# Patient Record
Sex: MALE | Race: WHITE | NOT HISPANIC OR LATINO | Employment: FULL TIME | URBAN - METROPOLITAN AREA
[De-identification: names, ages, dates, MRNs, and addresses within clinical notes are randomized per-mention and may not be internally consistent; named-entity substitution may affect disease eponyms.]

---

## 2017-04-03 ENCOUNTER — OFFICE VISIT (OUTPATIENT)
Dept: FAMILY MEDICINE | Facility: CLINIC | Age: 47
End: 2017-04-03
Payer: COMMERCIAL

## 2017-04-03 VITALS
WEIGHT: 185 LBS | HEIGHT: 67 IN | TEMPERATURE: 98.4 F | BODY MASS INDEX: 29.03 KG/M2 | SYSTOLIC BLOOD PRESSURE: 124 MMHG | OXYGEN SATURATION: 97 % | RESPIRATION RATE: 16 BRPM | DIASTOLIC BLOOD PRESSURE: 75 MMHG | HEART RATE: 66 BPM

## 2017-04-03 DIAGNOSIS — E06.3 HYPOTHYROIDISM DUE TO HASHIMOTO'S THYROIDITIS: Primary | ICD-10-CM

## 2017-04-03 DIAGNOSIS — F43.22 ADJUSTMENT DISORDER WITH ANXIOUS MOOD: ICD-10-CM

## 2017-04-03 LAB
ANION GAP SERPL CALCULATED.3IONS-SCNC: 9 MMOL/L (ref 3–14)
BUN SERPL-MCNC: 14 MG/DL (ref 7–30)
CALCIUM SERPL-MCNC: 8.9 MG/DL (ref 8.5–10.1)
CHLORIDE SERPL-SCNC: 109 MMOL/L (ref 94–109)
CHOLEST SERPL-MCNC: 191 MG/DL
CO2 SERPL-SCNC: 24 MMOL/L (ref 20–32)
CREAT SERPL-MCNC: 0.79 MG/DL (ref 0.66–1.25)
ERYTHROCYTE [DISTWIDTH] IN BLOOD BY AUTOMATED COUNT: 12.7 % (ref 10–15)
GFR SERPL CREATININE-BSD FRML MDRD: NORMAL ML/MIN/1.7M2
GLUCOSE SERPL-MCNC: 96 MG/DL (ref 70–99)
HCT VFR BLD AUTO: 41.8 % (ref 40–53)
HDLC SERPL-MCNC: 39 MG/DL
HGB BLD-MCNC: 14.2 G/DL (ref 13.3–17.7)
LDLC SERPL CALC-MCNC: 126 MG/DL
MCH RBC QN AUTO: 30.8 PG (ref 26.5–33)
MCHC RBC AUTO-ENTMCNC: 34 G/DL (ref 31.5–36.5)
MCV RBC AUTO: 91 FL (ref 78–100)
NONHDLC SERPL-MCNC: 152 MG/DL
PLATELET # BLD AUTO: 244 10E9/L (ref 150–450)
POTASSIUM SERPL-SCNC: 4.2 MMOL/L (ref 3.4–5.3)
RBC # BLD AUTO: 4.61 10E12/L (ref 4.4–5.9)
SODIUM SERPL-SCNC: 142 MMOL/L (ref 133–144)
TRIGL SERPL-MCNC: 130 MG/DL
TSH SERPL DL<=0.05 MIU/L-ACNC: 0.91 MU/L (ref 0.4–4)
WBC # BLD AUTO: 7.2 10E9/L (ref 4–11)

## 2017-04-03 PROCEDURE — 36415 COLL VENOUS BLD VENIPUNCTURE: CPT | Performed by: FAMILY MEDICINE

## 2017-04-03 PROCEDURE — 99214 OFFICE O/P EST MOD 30 MIN: CPT | Performed by: FAMILY MEDICINE

## 2017-04-03 PROCEDURE — 85027 COMPLETE CBC AUTOMATED: CPT | Performed by: FAMILY MEDICINE

## 2017-04-03 PROCEDURE — 80061 LIPID PANEL: CPT | Performed by: FAMILY MEDICINE

## 2017-04-03 PROCEDURE — 84443 ASSAY THYROID STIM HORMONE: CPT | Performed by: FAMILY MEDICINE

## 2017-04-03 PROCEDURE — 80048 BASIC METABOLIC PNL TOTAL CA: CPT | Performed by: FAMILY MEDICINE

## 2017-04-03 ASSESSMENT — ANXIETY QUESTIONNAIRES
6. BECOMING EASILY ANNOYED OR IRRITABLE: NOT AT ALL
7. FEELING AFRAID AS IF SOMETHING AWFUL MIGHT HAPPEN: SEVERAL DAYS
IF YOU CHECKED OFF ANY PROBLEMS ON THIS QUESTIONNAIRE, HOW DIFFICULT HAVE THESE PROBLEMS MADE IT FOR YOU TO DO YOUR WORK, TAKE CARE OF THINGS AT HOME, OR GET ALONG WITH OTHER PEOPLE: SOMEWHAT DIFFICULT
3. WORRYING TOO MUCH ABOUT DIFFERENT THINGS: SEVERAL DAYS
2. NOT BEING ABLE TO STOP OR CONTROL WORRYING: NOT AT ALL
1. FEELING NERVOUS, ANXIOUS, OR ON EDGE: SEVERAL DAYS
5. BEING SO RESTLESS THAT IT IS HARD TO SIT STILL: NOT AT ALL

## 2017-04-03 NOTE — PROGRESS NOTES
SUBJECTIVE:                                                    Bill Worrell is a 46 year old male who presents to clinic today for the following health issues:      Abnormal Mood Symptoms     Onset: 3 months    Description:   Depression: no  Anxiety: YES    Accompanying Signs & Symptoms:  Still participating in activities that you used to enjoy: YES  Fatigue: YES  Irritability: no  Difficulty concentrating: no  Changes in appetite: no  Problems with sleep: no  Heart racing/beating fast : YES  Thoughts of hurting yourself or others: none     History:   Recent stress: YES- at work  Prior depression hospitalization: None  Family history of depression: no  Family history of anxiety: no      Precipitating factors:   Alcohol/drug use: no    Alleviating factors:  none       Therapies Tried and outcome: None      Pt feels whenever he is stressed, feels heart is pumping stronger, these symptoms last for 15 to 30 minutes also feels fatigue at the end of the day.    Problem list and histories reviewed & adjusted, as indicated.  Additional history: as documented    Patient Active Problem List   Diagnosis     Mixed hyperlipidemia     Hypothyroidism     Psoriasis     Hyperlipidemia LDL goal <160     No past surgical history on file.    Social History   Substance Use Topics     Smoking status: Former Smoker     Types: Cigars     Smokeless tobacco: Never Used      Comment: a cigar every 4 months     Alcohol use 0.0 oz/week     0 Standard drinks or equivalent per week      Comment: rarely     Family History   Problem Relation Age of Onset     HEART DISEASE Father      heart attack at age 55         Current Outpatient Prescriptions   Medication Sig Dispense Refill     levothyroxine (SYNTHROID,LEVOTHROID) 100 MCG tablet Take 1 tablet (100 mcg) by mouth daily 90 tablet 3     Allergies   Allergen Reactions     Dogs      Penicillins        Reviewed and updated as needed this visit by clinical staff       Reviewed and updated as  "needed this visit by Provider         ROS:  C: NEGATIVE for fever, chills, change in weight  CV: NEGATIVE for chest pain, palpitations or peripheral edema    OBJECTIVE:                                                    /75 (BP Location: Right arm, Patient Position: Chair, Cuff Size: Adult Regular)  Pulse 66  Temp 98.4  F (36.9  C) (Oral)  Resp 16  Ht 5' 6.5\" (1.689 m)  Wt 185 lb (83.9 kg)  SpO2 97%  BMI 29.41 kg/m2  Body mass index is 29.41 kg/(m^2).  GENERAL: healthy, alert and no distress  RESP: lungs clear to auscultation - no rales, rhonchi or wheezes  CV: regular rate and rhythm, normal S1 S2, no S3 or S4, no murmur, click or rub, no peripheral edema and peripheral pulses strong  MS: no gross musculoskeletal defects noted, no edema         ASSESSMENT/PLAN:                                                            1. Hypothyroidism due to Hashimoto's thyroiditis  Recheck.  - TSH    2. Adjustment disorder with anxious mood  Talked about ways to improve anxiety,   - Lipid Profile with reflex to direct LDL  - Basic metabolic panel  - CBC with platelets    FUTURE APPOINTMENTS:       - Follow-up visit in 1 year.    Arline Miller MD  [facility]    "

## 2017-04-03 NOTE — MR AVS SNAPSHOT
"              After Visit Summary   4/3/2017    Bill Worrell    MRN: [id number]           Patient Information     Date Of Birth          1970        Visit Information        Provider Department      4/3/2017 9:00 AM Arline Miller MD [facility]        Today's Diagnoses     Hypothyroidism due to Hashimoto's thyroiditis    -  1    Adjustment disorder with anxious mood           Follow-ups after your visit        Who to contact     If you have questions or need follow up information about today's clinic visit or your schedule please contact [facility] directly at [phone].  Normal or non-critical lab and imaging results will be communicated to you by [employer], letter or phone within 4 business days after the clinic has received the results. If you do not hear from us within 7 days, please contact the clinic through [employer] or phone. If you have a critical or abnormal lab result, we will notify you by phone as soon as possible.  Submit refill requests through [employer] or call your pharmacy and they will forward the refill request to us. Please allow 3 business days for your refill to be completed.          Additional Information About Your Visit        MyChart Information     [employer] lets you send messages to your doctor, view your test results, renew your prescriptions, schedule appointments and more. To sign up, go to [email or web address] . Click on \"Log in\" on the left side of the screen, which will take you to the Welcome page. Then click on \"Sign up Now\" on the right side of the page.     You will be asked to enter the access code listed below, as well as some personal information. Please follow the directions to create your username and password.     Your access code is: [email or web address]  Expires: 2017  9:43 AM     Your access code will  in 90 days. If you need help or a new code, please call your [facility] or [phone].        Care EveryWhere ID  " "   This is your Care EveryWhere ID. This could be used by other organizations to access your [place] medical records  [id number]        Your Vitals Were     Pulse Temperature Respirations Height Pulse Oximetry BMI (Body Mass Index)    66 98.4  F (36.9  C) (Oral) 16 5' 6.5\" (1.689 m) 97% 29.41 kg/m2       Blood Pressure from Last 3 Encounters:   04/03/17 124/75   05/14/16 114/69   04/11/15 124/82    Weight from Last 3 Encounters:   04/03/17 185 lb (83.9 kg)   05/14/16 184 lb (83.5 kg)   04/11/15 178 lb (80.7 kg)              We Performed the Following     Basic metabolic panel     CBC with platelets     Lipid Profile with reflex to direct LDL     TSH        Primary Care Provider Office Phone # Fax #    Arline Miller [phone]       [facility] [id number] [place] [address]        Thank you!     Thank you for choosing [facility]  for your care. Our goal is always to provide you with excellent care. Hearing back from our patients is one way we can continue to improve our services. Please take a few minutes to complete the written survey that you may receive in the mail after your visit with us. Thank you!             Your Updated Medication List - Protect others around you: Learn how to safely use, store and throw away your medicines at www.disposemymeds.org.          This list is accurate as of: 4/3/17  9:43 AM.  Always use your most recent med list.                   Brand Name Dispense Instructions for use    levothyroxine 100 MCG tablet    SYNTHROID/LEVOTHROID    90 tablet    Take 1 tablet (100 mcg) by mouth daily         "

## 2017-04-03 NOTE — NURSING NOTE
"Chief Complaint   Patient presents with     Anxiety     stress at work       Initial /75 (BP Location: Right arm, Patient Position: Chair, Cuff Size: Adult Regular)  Pulse 66  Temp 98.4  F (36.9  C) (Oral)  Resp 16  Ht 5' 6.5\" (1.689 m)  Wt 185 lb (83.9 kg)  SpO2 97%  BMI 29.41 kg/m2 Estimated body mass index is 29.41 kg/(m^2) as calculated from the following:    Height as of this encounter: 5' 6.5\" (1.689 m).    Weight as of this encounter: 185 lb (83.9 kg).  Medication Reconciliation: complete   Jo Francisco, MANISHA      "

## 2017-04-03 NOTE — LETTER
[facility]  [address] [place], [address]  [phone]      April 4, 2017    Bill Worrell  [address] [facility] [address]          Dear Bill,      Your labs are all within normal including your thyroid, blood chemistry, blood count and cholesterol panel. Enclosed is a copy of the results.  It was a pleasure to see you at your last appointment.    If you have any questions or concerns, please call myself or my nurse at [phone].          Sincerely,    Arline Miller MD    Results for orders placed or performed in visit on 04/03/17   Lipid Profile with reflex to direct LDL   Result Value Ref Range    Cholesterol 191 <200 mg/dL    Triglycerides 130 <150 mg/dL    HDL Cholesterol 39 (L) >39 mg/dL    LDL Cholesterol Calculated 126 (H) <100 mg/dL    Non HDL Cholesterol 152 (H) <130 mg/dL   TSH   Result Value Ref Range    TSH 0.91 0.40 - 4.00 mU/L   Basic metabolic panel   Result Value Ref Range    Sodium 142 133 - 144 mmol/L    Potassium 4.2 3.4 - 5.3 mmol/L    Chloride 109 94 - 109 mmol/L    Carbon Dioxide 24 20 - 32 mmol/L    Anion Gap 9 3 - 14 mmol/L    Glucose 96 70 - 99 mg/dL    Urea Nitrogen 14 7 - 30 mg/dL    Creatinine 0.79 0.66 - 1.25 mg/dL    GFR Estimate >90  Non  GFR Calc   >60 mL/min/1.7m2    GFR Estimate If Black >90   GFR Calc   >60 mL/min/1.7m2    Calcium 8.9 8.5 - 10.1 mg/dL   CBC with platelets   Result Value Ref Range    WBC 7.2 4.0 - 11.0 10e9/L    RBC Count 4.61 4.4 - 5.9 10e12/L    Hemoglobin 14.2 13.3 - 17.7 g/dL    Hematocrit 41.8 40.0 - 53.0 %    MCV 91 78 - 100 fl    MCH 30.8 26.5 - 33.0 pg    MCHC 34.0 31.5 - 36.5 g/dL    RDW 12.7 10.0 - 15.0 %    Platelet Count 244 150 - 450 10e9/L     SS

## 2017-04-04 ASSESSMENT — PATIENT HEALTH QUESTIONNAIRE - PHQ9: SUM OF ALL RESPONSES TO PHQ QUESTIONS 1-9: 2

## 2017-05-08 DIAGNOSIS — E06.3 HYPOTHYROIDISM DUE TO HASHIMOTO'S THYROIDITIS: ICD-10-CM

## 2017-05-08 NOTE — TELEPHONE ENCOUNTER
levothyroxine (SYNTHROID/LEVOTHROID) 100 MCG tablet     Last Written Prescription Date: 5/16/16  Last Quantity: 90, # refills: 3  Last Office Visit with [facility], [facility] or [facility] prescribing provider: 4/3/2017          TSH   Date Value Ref Range Status   04/03/2017 0.91 0.40 - 4.00 mU/L Final     JUSTINE Gray  May 8, 2017  2:56 PM

## 2017-05-09 RX ORDER — LEVOTHYROXINE SODIUM 100 UG/1
TABLET ORAL
Qty: 90 TABLET | Refills: 3 | Status: SHIPPED | OUTPATIENT
Start: 2017-05-09 | End: 2018-05-01

## 2017-05-09 NOTE — TELEPHONE ENCOUNTER
Prescription approved per [facility].    Traci Kwon, RN, BSN, PHN

## 2017-07-10 ENCOUNTER — OFFICE VISIT (OUTPATIENT)
Dept: FAMILY MEDICINE | Facility: CLINIC | Age: 47
End: 2017-07-10
Payer: COMMERCIAL

## 2017-07-10 VITALS
WEIGHT: 183 LBS | BODY MASS INDEX: 28.72 KG/M2 | TEMPERATURE: 98.4 F | RESPIRATION RATE: 12 BRPM | HEIGHT: 67 IN | DIASTOLIC BLOOD PRESSURE: 78 MMHG | HEART RATE: 60 BPM | SYSTOLIC BLOOD PRESSURE: 110 MMHG

## 2017-07-10 DIAGNOSIS — L20.84 INTRINSIC ECZEMA: Primary | ICD-10-CM

## 2017-07-10 PROBLEM — E06.3 HYPOTHYROIDISM DUE TO HASHIMOTO'S THYROIDITIS: Status: ACTIVE | Noted: 2017-07-10

## 2017-07-10 PROCEDURE — 99213 OFFICE O/P EST LOW 20 MIN: CPT | Performed by: FAMILY MEDICINE

## 2017-07-10 RX ORDER — TRIAMCINOLONE ACETONIDE 1 MG/G
CREAM TOPICAL 2 TIMES DAILY
Qty: 45 G | Refills: 11 | Status: SHIPPED | OUTPATIENT
Start: 2017-07-10 | End: 2017-11-02

## 2017-07-10 NOTE — NURSING NOTE
"Chief Complaint   Patient presents with     Derm Problem       Initial /78 (BP Location: Right arm, Patient Position: Chair, Cuff Size: Adult Regular)  Pulse 60  Temp 98.4  F (36.9  C) (Oral)  Resp 12  Ht 5' 6.5\" (1.689 m)  Wt 183 lb (83 kg)  BMI 29.09 kg/m2 Estimated body mass index is 29.09 kg/(m^2) as calculated from the following:    Height as of this encounter: 5' 6.5\" (1.689 m).    Weight as of this encounter: 183 lb (83 kg).  Medication Reconciliation: complete   Alda Lorenz CMA      "

## 2017-07-10 NOTE — MR AVS SNAPSHOT
"              After Visit Summary   7/10/2017    Bill Worrell    MRN: [id number]           Patient Information     Date Of Birth          1970        Visit Information        Provider Department      7/10/2017 10:15 AM Arline Miller MD [facility]        Today's Diagnoses     Intrinsic eczema    -  1       Follow-ups after your visit        Who to contact     If you have questions or need follow up information about today's clinic visit or your schedule please contact [facility] directly at [phone].  Normal or non-critical lab and imaging results will be communicated to you by MyChart, letter or phone within 4 business days after the clinic has received the results. If you do not hear from us within 7 days, please contact the clinic through [employer] or phone. If you have a critical or abnormal lab result, we will notify you by phone as soon as possible.  Submit refill requests through [employer] or call your pharmacy and they will forward the refill request to us. Please allow 3 business days for your refill to be completed.          Additional Information About Your Visit        MyChart Information     [employer] lets you send messages to your doctor, view your test results, renew your prescriptions, schedule appointments and more. To sign up, go to [email or web address] . Click on \"Log in\" on the left side of the screen, which will take you to the Welcome page. Then click on \"Sign up Now\" on the right side of the page.     You will be asked to enter the access code listed below, as well as some personal information. Please follow the directions to create your username and password.     Your access code is: [email or web address]  Expires: 10/8/2017 10:29 AM     Your access code will  in 90 days. If you need help or a new code, please call your [facility] or [phone].        Care EveryWhere ID     This is your Care EveryWhere ID. This could be used by other " "organizations to access your [place] medical records  [id number]        Your Vitals Were     Pulse Temperature Respirations Height BMI (Body Mass Index)       60 98.4  F (36.9  C) (Oral) 12 5' 6.5\" (1.689 m) 29.09 kg/m2        Blood Pressure from Last 3 Encounters:   07/10/17 110/78   04/03/17 124/75   05/14/16 114/69    Weight from Last 3 Encounters:   07/10/17 183 lb (83 kg)   04/03/17 185 lb (83.9 kg)   05/14/16 184 lb (83.5 kg)              Today, you had the following     No orders found for display         Today's Medication Changes          These changes are accurate as of: 7/10/17 10:29 AM.  If you have any questions, ask your nurse or doctor.               Start taking these medicines.        Dose/Directions    triamcinolone 0.1 % cream   Commonly known as:  KENALOG   Used for:  Intrinsic eczema   Started by:  Arline Miller MD        Apply topically 2 times daily   Quantity:  45 g   Refills:  11            Where to get your medicines      These medications were sent to [facility]/pharmacy #[id number] - [place], [place] - [address]  [facility]  [address]  [facility] [address]     Phone:  [phone]     triamcinolone 0.1 % cream                Primary Care Provider Office Phone # Fax #    Arline Miller [phone]       [facility] [id number] [place] [address]        Equal Access to Services     KARIE FIGUEROA AH: Verenice chavarriao Soankur, waaxda luqadaha, qaybta kaalmada kennyegyada, waxalma piyush earl. So [facility] [phone].    ATENCIÓN: Si habla latonya, tiene a eid disposición servicios gratuitos de asistencia lingüística. Llame al [phone].    We comply with applicable federal civil rights laws and [place] laws. We do not discriminate on the basis of race, color, national origin, age, disability sex, sexual orientation or gender identity.            Thank you!     Thank you for choosing [facility]  for your care. Our goal is " always to provide you with excellent care. Hearing back from our patients is one way we can continue to improve our services. Please take a few minutes to complete the written survey that you may receive in the mail after your visit with us. Thank you!             Your Updated Medication List - Protect others around you: Learn how to safely use, store and throw away your medicines at www.disposemymeds.org.          This list is accurate as of: 7/10/17 10:29 AM.  Always use your most recent med list.                   Brand Name Dispense Instructions for use Diagnosis    levothyroxine 100 MCG tablet    SYNTHROID/LEVOTHROID    90 tablet    TAKE 1 TABLET (100 MCG) BY MOUTH DAILY    Hypothyroidism due to Hashimoto's thyroiditis       triamcinolone 0.1 % cream    KENALOG    45 g    Apply topically 2 times daily    Intrinsic eczema

## 2017-07-10 NOTE — PROGRESS NOTES
"  SUBJECTIVE:                                                    Bill Worrell is a 47 year old male who presents to clinic today for the following health issues:    Rash      Duration: 6 months    Description  Location: L hip  Itching: no    Intensity:  No pain    Accompanying signs and symptoms: None    History (similar episodes/previous evaluation): yes    Precipitating or alleviating factors:  New exposures:  None  Recent travel: no      Therapies tried and outcome: none          Problem list and histories reviewed & adjusted, as indicated.  Additional history: as documented    Patient Active Problem List   Diagnosis     Mixed hyperlipidemia     Hyperlipidemia LDL goal <160     Hypothyroidism due to Hashimoto's thyroiditis     Intrinsic eczema     History reviewed. No pertinent surgical history.    Social History   Substance Use Topics     Smoking status: Former Smoker     Types: Cigars     Smokeless tobacco: Never Used      Comment: a cigar every 4 months     Alcohol use 0.0 oz/week     0 Standard drinks or equivalent per week      Comment: rarely     Family History   Problem Relation Age of Onset     HEART DISEASE Father      heart attack at age 55         Current Outpatient Prescriptions   Medication Sig Dispense Refill     triamcinolone (KENALOG) 0.1 % cream Apply topically 2 times daily 45 g 11     levothyroxine (SYNTHROID/LEVOTHROID) 100 MCG tablet TAKE 1 TABLET (100 MCG) BY MOUTH DAILY 90 tablet 3       Reviewed and updated as needed this visit by clinical staff  Tobacco  Allergies  Med Hx  Surg Hx  Fam Hx  Soc Hx      Reviewed and updated as needed this visit by Provider         ROS:      OBJECTIVE:     /78 (BP Location: Right arm, Patient Position: Chair, Cuff Size: Adult Regular)  Pulse 60  Temp 98.4  F (36.9  C) (Oral)  Resp 12  Ht 5' 6.5\" (1.689 m)  Wt 183 lb (83 kg)  BMI 29.09 kg/m2  Body mass index is 29.09 kg/(m^2).  Skin: round erythematous flaky patch about 5 cm in size on " the Rt flank, with smaller 4 cm size patch on the Lt flank.        ASSESSMENT/PLAN:             1. Intrinsic eczema    - triamcinolone (KENALOG) 0.1 % cream; Apply topically 2 times daily  Dispense: 45 g; Refill: 11    Follow up in 5 days if symptoms persist, sooner if symptoms worsen or new ones develops, pt may contact us over the phone for any questions or concerns.      Arline Miller MD  [facility]

## 2017-11-02 ENCOUNTER — OFFICE VISIT (OUTPATIENT)
Dept: FAMILY MEDICINE | Facility: CLINIC | Age: 47
End: 2017-11-02
Payer: COMMERCIAL

## 2017-11-02 VITALS
TEMPERATURE: 98.9 F | DIASTOLIC BLOOD PRESSURE: 72 MMHG | BODY MASS INDEX: 28.56 KG/M2 | WEIGHT: 182 LBS | HEIGHT: 67 IN | SYSTOLIC BLOOD PRESSURE: 110 MMHG | RESPIRATION RATE: 20 BRPM | HEART RATE: 82 BPM | OXYGEN SATURATION: 98 %

## 2017-11-02 DIAGNOSIS — K40.90 NON-RECURRENT UNILATERAL INGUINAL HERNIA WITHOUT OBSTRUCTION OR GANGRENE: Primary | ICD-10-CM

## 2017-11-02 PROCEDURE — 99214 OFFICE O/P EST MOD 30 MIN: CPT | Performed by: FAMILY MEDICINE

## 2017-11-02 NOTE — MR AVS SNAPSHOT
After Visit Summary   11/2/2017    Bill Worrell    MRN: [id number]           Patient Information     Date Of Birth          1970        Visit Information        Provider Department      11/2/2017 6:30 PM Arline Miller MD [facility]        Today's Diagnoses     Non-recurrent unilateral inguinal hernia without obstruction or gangrene    -  1       Follow-ups after your visit        Additional Services     GENERAL SURG ADULT REFERRAL       Your provider has referred you to: [facility]: [facility] [phone]   [email or web address]    Please be aware that coverage of these services is subject to the terms and limitations of your health insurance plan.  Call member services at your health plan with any benefit or coverage questions.      Please bring the following with you to your appointment:    (1) Any X-Rays, CTs or MRIs which have been performed.  Contact the facility where they were done to arrange for  prior to your scheduled appointment.   (2) List of current medications   (3) This referral request   (4) Any documents/labs given to you for this referral                  Who to contact     If you have questions or need follow up information about today's clinic visit or your schedule please contact [facility] directly at [phone].  Normal or non-critical lab and imaging results will be communicated to you by MyChart, letter or phone within 4 business days after the clinic has received the results. If you do not hear from us within 7 days, please contact the clinic through MyChart or phone. If you have a critical or abnormal lab result, we will notify you by phone as soon as possible.  Submit refill requests through [employer] or call your pharmacy and they will forward the refill request to us. Please allow 3 business days for your refill to be completed.          Additional Information  "About Your Visit        [employer] Information     [employer] lets you send messages to your doctor, view your test results, renew your prescriptions, schedule appointments and more. To sign up, go to [email or web address] . Click on \"Log in\" on the left side of the screen, which will take you to the Welcome page. Then click on \"Sign up Now\" on the right side of the page.     You will be asked to enter the access code listed below, as well as some personal information. Please follow the directions to create your username and password.     Your access code is: [email or web address]  Expires: 2018  6:48 PM     Your access code will  in 90 days. If you need help or a new code, please call your [place] clinic or [phone].        Care EveryWhere ID     This is your Care EveryWhere ID. This could be used by other organizations to access your [place] medical records  [id number]        Your Vitals Were     Pulse Temperature Respirations Height Pulse Oximetry BMI (Body Mass Index)    82 98.9  F (37.2  C) (Oral) 20 5' 6.5\" (1.689 m) 98% 28.94 kg/m2       Blood Pressure from Last 3 Encounters:   17 110/72   07/10/17 110/78   17 124/75    Weight from Last 3 Encounters:   17 182 lb (82.6 kg)   07/10/17 183 lb (83 kg)   17 185 lb (83.9 kg)              We Performed the Following     GENERAL SURG ADULT REFERRAL        Primary Care Provider Office Phone # Fax #    Arline Miller [phone] [address] [facility] [address]        Equal Access to Services     [facility] HENRY : Hadbreana Becerra, michelle reynolds, carlos benjamin. So [facility] [phone].    ATENCIÓN: Si habla español, tiene a eid disposición servicios gratuitos de asistencia lingüística. Llame al [phone].    We comply with applicable federal civil rights laws and [place] laws. We do not discriminate on the basis of race, color, national origin, age, " disability, sex, sexual orientation, or gender identity.            Thank you!     Thank you for choosing [facility]  for your care. Our goal is always to provide you with excellent care. Hearing back from our patients is one way we can continue to improve our services. Please take a few minutes to complete the written survey that you may receive in the mail after your visit with us. Thank you!             Your Updated Medication List - Protect others around you: Learn how to safely use, store and throw away your medicines at www.disposemymeds.org.          This list is accurate as of: 11/2/17  6:48 PM.  Always use your most recent med list.                   Brand Name Dispense Instructions for use Diagnosis    levothyroxine 100 MCG tablet    SYNTHROID/LEVOTHROID    90 tablet    TAKE 1 TABLET (100 MCG) BY MOUTH DAILY    Hypothyroidism due to Hashimoto's thyroiditis

## 2017-11-02 NOTE — PROGRESS NOTES
"  SUBJECTIVE:   Bill Worrell is a 47 year old male who presents to clinic today for the following health issues:      Concern - hernia  Onset: 2 1/2 weeks    Description:   Pain on right side    Intensity: mild, moderate    Progression of Symptoms:  same and intermittent    Accompanying Signs & Symptoms:  He feels pressure on the Rt sie of the thigh.    Previous history of similar problem:   n/a            Problem list and histories reviewed & adjusted, as indicated.  Additional history: as documented    Patient Active Problem List   Diagnosis     Mixed hyperlipidemia     Hyperlipidemia LDL goal <160     Hypothyroidism due to Hashimoto's thyroiditis     Intrinsic eczema     History reviewed. No pertinent surgical history.    Social History   Substance Use Topics     Smoking status: Former Smoker     Types: Cigars     Smokeless tobacco: Never Used      Comment: a cigar every 4 months     Alcohol use 0.0 oz/week     0 Standard drinks or equivalent per week      Comment: rarely     Family History   Problem Relation Age of Onset     HEART DISEASE Father      heart attack at age 55         Current Outpatient Prescriptions   Medication Sig Dispense Refill     levothyroxine (SYNTHROID/LEVOTHROID) 100 MCG tablet TAKE 1 TABLET (100 MCG) BY MOUTH DAILY 90 tablet 3         Reviewed and updated as needed this visit by clinical staffTobacco  Allergies  Surg Hx  Fam Hx  Soc Hx      Reviewed and updated as needed this visit by Provider         ROS:  C: NEGATIVE for fever, chills, change in weight  GI: NEGATIVE for nausea, abdominal pain, heartburn, or change in bowel habits    OBJECTIVE:     /72 (BP Location: Right arm, Patient Position: Chair, Cuff Size: Adult Large)  Pulse 82  Temp 98.9  F (37.2  C) (Oral)  Resp 20  Ht 5' 6.5\" (1.689 m)  Wt 182 lb (82.6 kg)  SpO2 98%  BMI 28.94 kg/m2  Body mass index is 28.94 kg/(m^2).  GENERAL: healthy, alert and no distress  ABDOMEN: soft, nontender, no " hepatosplenomegaly, no masses and bowel sounds normal   (male): testicles normal without atrophy or masses, hernia inguinal on the Rt side and penis normal without urethral discharge  No gangrene or obstruction signs.        ASSESSMENT/PLAN:             1. Non-recurrent unilateral inguinal hernia without obstruction or gangrene  Talked about options and benefit of surgical intervention, will refer to   - GENERAL SURG ADULT REFERRAL        Arline Miller MD  [facility]

## 2017-11-03 ENCOUNTER — OFFICE VISIT (OUTPATIENT)
Dept: SURGERY | Facility: CLINIC | Age: 47
End: 2017-11-03
Payer: COMMERCIAL

## 2017-11-03 VITALS
SYSTOLIC BLOOD PRESSURE: 112 MMHG | WEIGHT: 182 LBS | HEIGHT: 67 IN | DIASTOLIC BLOOD PRESSURE: 62 MMHG | HEART RATE: 68 BPM | RESPIRATION RATE: 16 BRPM | BODY MASS INDEX: 28.56 KG/M2

## 2017-11-03 DIAGNOSIS — K40.20 NON-RECURRENT BILATERAL INGUINAL HERNIA WITHOUT OBSTRUCTION OR GANGRENE: Primary | ICD-10-CM

## 2017-11-03 PROCEDURE — 99204 OFFICE O/P NEW MOD 45 MIN: CPT | Performed by: SURGERY

## 2017-11-03 NOTE — PROGRESS NOTES
"[facility] Surgery Consultation    PCP:  Arline Miller [phone]    HPI: Patient is a 47 year old male who is seen in consultation, referred by  who presents with right inguinal hernia. Hernia has been present for last 3 weeks. Pain is primarily located in the right groin and a burning sensation. Patient states pain is worse with excercise and heavy lifting. Pain is rated as mild. Symptoms are improved with rest. Hernia has not been incarcerated. Patient has not had any symptoms of bowel obstruction. Patient denies fevers, chills, nausea, vomiting, SOB. He does not recall any incident a few weeks ago when the pain began. 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 He works as a .     PMH:   has a past medical history of Hypothyroidism (7/14/2010); Hypothyroidism due to Hashimoto's thyroiditis (7/10/2017); Intrinsic eczema (7/10/2017); Mixed hyperlipidemia (10/19/2006); and Psoriasis (7/14/2010).  PSH:    none  Social History:   reports that he has quit smoking. His smoking use included Cigars. He has never used smokeless tobacco. He reports that he drinks alcohol. He reports that he does not use illicit drugs.  Family History:  family history includes HEART DISEASE in his father.  Medications/Allergies: Home medications and allergies reviewed.    ROS:  The 10 point Review of Systems is negative other than noted in the HPI.    Physical Exam:  /62  Pulse 68  Resp 16  Ht 5' 6.5\" (1.689 m)  Wt 182 lb (82.6 kg)  BMI 28.94 kg/m2  GENERAL: Generally appears well.  Psych: Alert and Oriented.  Normal affect  Eyes: Sclera clear  Respiratory:  Lungs clear to ausculation bilaterally with good air excursion  Cardiovascular:  Regular Rate and Rhythm with no murmurs gallops or rubs, normal peripheral pulses  GI: Abdomen soft, nontender, nondistended, no hernias, normal bowel sounds, no hepatosplenomegaly.   Groin- I examined the patient in both the standing and supine " positions. Right Groin- Moderate sized inguinal hernia, reducible. Left Groin- Small inguinal hernia. No scrotal or testicle abnormalities.  Lymphatic/Hematologic/Immune:  No femoral or cervical lymphadenopathy.  Integumentary:  No rashes  Neurological: grossly intact     All new lab and imaging data was reviewed.     Impression and Plan:  Patient is a 47 year old male with bilateral inguinal hernias.     PLAN: laparoscopic bilateral inguinal hernia repair with mesh.   I discussed the pathophysiology of hernias and options for repair including laparoscopic VS open.  The risks associated with the procedure including, but not limited to, recurrence, nerve entrapment or injury, persistence of pain, injury to the bowel/bladder, infertility, hematoma, mesh migration, mesh infection, MI, and PE were discussed with the patient. He indicated understanding of the discussion, asked appropriate questions, and provided consent. Signs and symptoms of incarceration were discussed. If these develop in the interim, he promises to call or go straight to the ER. I have provided the patient with an information pamphlet.  It was my pleasure to participate in the care of Bill Worrell in clinic today. Thank you for this consultation.     TIME SPENT:  30 minutes was spent with patient and greater than 50% of the time was spent counseling and coordination of care.    Kae Newberry MD  [facility]  [phone]    Please route or send letter to:  Primary Care Provider (PCP) and Referring Provider

## 2017-11-03 NOTE — MR AVS SNAPSHOT
After Visit Summary   11/3/2017    Bill Worrell    MRN: [id number]           Patient Information     Date Of Birth          1970        Visit Information        Provider Department      11/3/2017 3:45 PM Kae Newberry MD [facility] [place] [facility] Hernia      Care Instructions    Your surgery is scheduled on 11/22/17          Follow-ups after your visit        Your next 10 appointments already scheduled     Nov 03, 2017  3:45 PM CDT   CONSULT with aKe Newberry MD   [facility] [place] ([facility] [place]    [address]  [place] [address] [phone]              Who to contact     If you have questions or need follow up information about today's clinic visit or your schedule please contact [facility] directly at [phone].  Normal or non-critical lab and imaging results will be communicated to you by MyChart, letter or phone within 4 business days after the clinic has received the results. If you do not hear from us within 7 days, please contact the clinic through GENW[employer] or phone. If you have a critical or abnormal lab result, we will notify you by phone as soon as possible.  Submit refill requests through [employer] or call your pharmacy and they will forward the refill request to us. Please allow 3 business days for your refill to be completed.          Additional Information About Your Visit        MyChart Information     [employer] gives you secure access to your electronic health record. If you see a primary care provider, you can also send messages to your care team and make appointments. If you have questions, please call your primary care clinic.  If you do not have a primary care provider, please call [phone] and they will assist you.        Care EveryWhere ID     This is your Care EveryWhere ID. This could be used by other organizations to access your [facility]  "records  [id number]        Your Vitals Were     Pulse Respirations Height BMI (Body Mass Index)          68 16 5' 6.5\" (1.689 m) 28.94 kg/m2         Blood Pressure from Last 3 Encounters:   11/03/17 112/62   11/02/17 110/72   07/10/17 110/78    Weight from Last 3 Encounters:   11/03/17 182 lb (82.6 kg)   11/02/17 182 lb (82.6 kg)   07/10/17 183 lb (83 kg)              Today, you had the following     No orders found for display       Primary Care Provider Office Phone # Fax #    Arline Miller [phone] [address] [facility] [address]        Equal Access to Services     RAJNI FIGUEROA : Verenice Becerra, waaxda luqadaha, qaybta kaalmada maddy, carlos jacome . So [facility] [phone].    ATENCIÓN: Si habla español, tiene a ied disposición servicios gratuitos de asistencia lingüística. LlThe [facility] [phone].    We comply with applicable federal civil rights laws and [place] laws. We do not discriminate on the basis of race, color, national origin, age, disability, sex, sexual orientation, or gender identity.            Thank you!     Thank you for choosing [facility]  for your care. Our goal is always to provide you with excellent care. Hearing back from our patients is one way we can continue to improve our services. Please take a few minutes to complete the written survey that you may receive in the mail after your visit with us. Thank you!             Your Updated Medication List - Protect others around you: Learn how to safely use, store and throw away your medicines at www.disposemymeds.org.          This list is accurate as of: 11/3/17  3:42 PM.  Always use your most recent med list.                   Brand Name Dispense Instructions for use Diagnosis    levothyroxine 100 MCG tablet    SYNTHROID/LEVOTHROID    90 tablet    TAKE 1 TABLET (100 MCG) BY MOUTH DAILY    Hypothyroidism due to Hashimoto's thyroiditis         "

## 2017-11-03 NOTE — LETTER
"November 3, 2017    Re: Bill Worrell - 1970    HPI: Patient is a 47 year old male who is seen in consultation, referred by  who presents with right inguinal hernia. Hernia has been present for last 3 weeks. Pain is primarily located in the right groin and a burning sensation. Patient states pain is worse with excercise and heavy lifting. Pain is rated as mild. Symptoms are improved with rest. Hernia has not been incarcerated. Patient has not had any symptoms of bowel obstruction. Patient denies fevers, chills, nausea, vomiting, SOB. He does not recall any incident a few weeks ago when the pain began. 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 He works as a .      PMH:   has a past medical history of Hypothyroidism (2010); Hypothyroidism due to Hashimoto's thyroiditis (7/10/2017); Intrinsic eczema (7/10/2017); Mixed hyperlipidemia (10/19/2006); and Psoriasis (2010).  PSH:    none  Social History:   reports that he has quit smoking. His smoking use included Cigars. He has never used smokeless tobacco. He reports that he drinks alcohol. He reports that he does not use illicit drugs.  Family History:  family history includes HEART DISEASE in his father.  Medications/Allergies: Home medications and allergies reviewed.     ROS:  The 10 point Review of Systems is negative other than noted in the HPI.     Physical Exam:  /62  Pulse 68  Resp 16  Ht 5' 6.5\" (1.689 m)  Wt 182 lb (82.6 kg)  BMI 28.94 kg/m2  GENERAL: Generally appears well.  Psych: Alert and Oriented.  Normal affect  Eyes: Sclera clear  Respiratory:  Lungs clear to ausculation bilaterally with good air excursion  Cardiovascular:  Regular Rate and Rhythm with no murmurs gallops or rubs, normal peripheral pulses  GI: Abdomen soft, nontender, nondistended, no hernias, normal bowel sounds, no hepatosplenomegaly.   Groin- I examined the patient in both the standing and supine positions. Right " Groin- Moderate sized inguinal hernia, reducible. Left Groin- Small inguinal hernia. No scrotal or testicle abnormalities.  Lymphatic/Hematologic/Immune:  No femoral or cervical lymphadenopathy.  Integumentary:  No rashes  Neurological: grossly intact      All new lab and imaging data was reviewed.      Impression and Plan:  Patient is a 47 year old male with bilateral inguinal hernias.      PLAN: laparoscopic bilateral inguinal hernia repair with mesh.   I discussed the pathophysiology of hernias and options for repair including laparoscopic VS open.  The risks associated with the procedure including, but not limited to, recurrence, nerve entrapment or injury, persistence of pain, injury to the bowel/bladder, infertility, hematoma, mesh migration, mesh infection, MI, and PE were discussed with the patient. He indicated understanding of the discussion, asked appropriate questions, and provided consent. Signs and symptoms of incarceration were discussed. If these develop in the interim, he promises to call or go straight to the ER. I have provided the patient with an information pamphlet.  It was my pleasure to participate in the care of Bill Worrell in clinic today. Thank you for this consultation.        Kae Newberry MD  [facility]  [phone]

## 2017-11-09 ENCOUNTER — OFFICE VISIT (OUTPATIENT)
Dept: FAMILY MEDICINE | Facility: CLINIC | Age: 47
End: 2017-11-09
Payer: COMMERCIAL

## 2017-11-09 VITALS
OXYGEN SATURATION: 95 % | DIASTOLIC BLOOD PRESSURE: 73 MMHG | RESPIRATION RATE: 14 BRPM | HEART RATE: 74 BPM | HEIGHT: 67 IN | WEIGHT: 182.4 LBS | TEMPERATURE: 98.2 F | BODY MASS INDEX: 28.63 KG/M2 | SYSTOLIC BLOOD PRESSURE: 113 MMHG

## 2017-11-09 DIAGNOSIS — Z01.818 PREOP GENERAL PHYSICAL EXAM: Primary | ICD-10-CM

## 2017-11-09 LAB
ALBUMIN UR-MCNC: NEGATIVE MG/DL
APPEARANCE UR: CLEAR
BILIRUB UR QL STRIP: NEGATIVE
COLOR UR AUTO: YELLOW
GLUCOSE UR STRIP-MCNC: NEGATIVE MG/DL
HGB UR QL STRIP: NEGATIVE
KETONES UR STRIP-MCNC: NEGATIVE MG/DL
LEUKOCYTE ESTERASE UR QL STRIP: NEGATIVE
NITRATE UR QL: NEGATIVE
PH UR STRIP: 6.5 PH (ref 5–7)
SOURCE: NORMAL
SP GR UR STRIP: 1.01 (ref 1–1.03)
UROBILINOGEN UR STRIP-ACNC: 0.2 EU/DL (ref 0.2–1)

## 2017-11-09 PROCEDURE — 81003 URINALYSIS AUTO W/O SCOPE: CPT | Performed by: FAMILY MEDICINE

## 2017-11-09 PROCEDURE — 99214 OFFICE O/P EST MOD 30 MIN: CPT | Performed by: FAMILY MEDICINE

## 2017-11-09 NOTE — MR AVS SNAPSHOT
After Visit Summary   11/9/2017    Bill Worrell    MRN: [id number]           Patient Information     Date Of Birth          1970        Visit Information        Provider Department      11/9/2017 5:15 PM Arline Miller MD [facility]        Today's Diagnoses     Preop general physical exam    -  1      Care Instructions      Before Your Surgery      Call your surgeon if there is any change in your health. This includes signs of a cold or flu (such as a sore throat, runny nose, cough, rash or fever).    Do not smoke, drink alcohol or take over the counter medicine (unless your surgeon or primary care doctor tells you to) for the 24 hours before and after surgery.    If you take prescribed drugs: Follow your doctor s orders about which medicines to take and which to stop until after surgery.    Eating and drinking prior to surgery: follow the instructions from your surgeon    Take a shower or bath the night before surgery. Use the soap your surgeon gave you to gently clean your skin. If you do not have soap from your surgeon, use your regular soap. Do not shave or scrub the surgery site.  Wear clean pajamas and have clean sheets on your bed.           Follow-ups after your visit        Your next 10 appointments already scheduled     Nov 22, 2017 10:30 AM CST   [facility] Same Day Surgery with Kae Newberry MD   [facility]    [facility]   [phone]            Nov 22, 2017   Procedure with Kae Newberry MD   [facility] (--)    [address]  [facility] [id number] [phone]              Who to contact     If you have questions or need follow up information about today's clinic visit or your schedule please contact [facility] directly at [phone].  Normal or non-critical lab and imaging  "results will be communicated to you by MyChart, letter or phone within 4 business days after the clinic has received the results. If you do not hear from us within 7 days, please contact the clinic through [employer] or phone. If you have a critical or abnormal lab result, we will notify you by phone as soon as possible.  Submit refill requests through [employer] or call your pharmacy and they will forward the refill request to us. Please allow 3 business days for your refill to be completed.          Additional Information About Your Visit        [employer] Information     [employer] gives you secure access to your electronic health record. If you see a primary care provider, you can also send messages to your care team and make appointments. If you have questions, please call your primary care clinic.  If you do not have a primary care provider, please call [phone] and they will assist you.        Care EveryWhere ID     This is your Care EveryWhere ID. This could be used by other organizations to access your [place] medical records  [id number]        Your Vitals Were     Pulse Temperature Respirations Height Pulse Oximetry BMI (Body Mass Index)    74 98.2  F (36.8  C) (Oral) 14 5' 6.5\" (1.689 m) 95% 29 kg/m2       Blood Pressure from Last 3 Encounters:   11/09/17 113/73   11/03/17 112/62   11/02/17 110/72    Weight from Last 3 Encounters:   11/09/17 182 lb 6.4 oz (82.7 kg)   11/03/17 182 lb (82.6 kg)   11/02/17 182 lb (82.6 kg)              We Performed the Following     *UA reflex to Microscopic and Culture ([facility] and [facility] (except [place] and [place])        Primary Care Provider Office Phone # Fax #    Arline Miller [phone] [address] [facility] [address]        Equal Access to Services     RAJNI FIGUEROA : Verencie Becerra, wamodestada luqadaha, qaybta kaalmatim castañeda, carlos earl. So [facility] [phone].    ATENCIÓN: Si habla " español, tiene a eid disposición servicios gratuitos de asistencia lingüística. Michele gloria [phone].    We comply with applicable federal civil rights laws and [place] laws. We do not discriminate on the basis of race, color, national origin, age, disability, sex, sexual orientation, or gender identity.            Thank you!     Thank you for choosing [facility]  for your care. Our goal is always to provide you with excellent care. Hearing back from our patients is one way we can continue to improve our services. Please take a few minutes to complete the written survey that you may receive in the mail after your visit with us. Thank you!             Your Updated Medication List - Protect others around you: Learn how to safely use, store and throw away your medicines at www.disposemymeds.org.          This list is accurate as of: 11/9/17  5:22 PM.  Always use your most recent med list.                   Brand Name Dispense Instructions for use Diagnosis    levothyroxine 100 MCG tablet    SYNTHROID/LEVOTHROID    90 tablet    TAKE 1 TABLET (100 MCG) BY MOUTH DAILY    Hypothyroidism due to Hashimoto's thyroiditis

## 2017-11-09 NOTE — NURSING NOTE
"Chief Complaint   Patient presents with     Pre-Op Exam     hernia        Initial /73 (BP Location: Right arm, Patient Position: Chair, Cuff Size: Adult Large)  Pulse 74  Temp 98.2  F (36.8  C) (Oral)  Resp 14  Ht 5' 6.5\" (1.689 m)  Wt 182 lb 6.4 oz (82.7 kg)  SpO2 95%  BMI 29 kg/m2 Estimated body mass index is 29 kg/(m^2) as calculated from the following:    Height as of this encounter: 5' 6.5\" (1.689 m).    Weight as of this encounter: 182 lb 6.4 oz (82.7 kg).  Medication Reconciliation: complete   "

## 2017-11-09 NOTE — PROGRESS NOTES
[facility]  [address] [facility] [address]  [phone]  Dept: [phone]    PRE-OP EVALUATION:  Today's date: 2017    Bill Worrell (: 1970) presents for pre-operative evaluation assessment as requested by Dr. Kae Newberry.  He requires evaluation and anesthesia risk assessment prior to undergoing surgery/procedure for treatment of Hernia  .  Proposed procedure:     Date of Surgery/ Procedure: 17  Time of Surgery/ Procedure: 10:30 am   Hospital/Surgical Facility: [facility]     Primary Physician: Arline Miller  Type of Anesthesia Anticipated: General    Patient has a Health Care Directive or Living Will:  YES     Preop Questions 2017   1.  Do you have a history of heart attack, stroke, stent, bypass or surgery on an artery in the head, neck, heart or legs? No   2.  Do you ever have any pain or discomfort in your chest? No   3.  Do you have a history of  Heart Failure? No   4.   Are you troubled by shortness of breath when:  walking on a level surface, or up a slight hill, or at night? No   5.  Do you currently have a cold, bronchitis or other respiratory infection? No   6.  Do you have a cough, shortness of breath, or wheezing? No   7.  Do you sometimes get pains in the calves of your legs when you walk? No   8. Do you or anyone in your family have previous history of blood clots? No   9.  Do you or does anyone in your family have a serious bleeding problem such as prolonged bleeding following surgeries or cuts? No   10. Have you ever had problems with anemia or been told to take iron pills? No   11. Have you had any abnormal blood loss such as black, tarry or bloody stools? No   12. Have you ever had a blood transfusion? No   13. Have you or any of your relatives ever had problems with anesthesia? No   14. Do you have sleep apnea, excessive snoring or daytime drowsiness? No   15. Do you have any prosthetic heart valves? No   16.  Do you have prosthetic joints? No           HPI:                                                      Brief HPI related to upcoming procedure: inguinal hernia.      HYPOTHYROIDISM - Patient has a longstanding history of chronic Hypothyroidism. Patient has been doing well, noting no tremor, insomnia, hair loss or changes in skin texture. Last TSH value of normal.. Continues to take medications as directed, without adverse reactions or side effects.                                                                                                                                                                                                                        .    MEDICAL HISTORY:                                                    Patient Active Problem List    Diagnosis Date Noted     Hypothyroidism due to Hashimoto's thyroiditis 07/10/2017     Priority: Medium     Intrinsic eczema 07/10/2017     Priority: Medium     Hyperlipidemia LDL goal <160 06/13/2011     Priority: Medium     Mixed hyperlipidemia 10/19/2006     Priority: Medium      Past Medical History:   Diagnosis Date     Hypothyroidism 7/14/2010     Hypothyroidism due to Hashimoto's thyroiditis 7/10/2017     Intrinsic eczema 7/10/2017     Mixed hyperlipidemia 10/19/2006     Psoriasis 7/14/2010     No past surgical history on file.  Current Outpatient Prescriptions   Medication Sig Dispense Refill     levothyroxine (SYNTHROID/LEVOTHROID) 100 MCG tablet TAKE 1 TABLET (100 MCG) BY MOUTH DAILY 90 tablet 3     OTC products: no recent use of OTC ASA, NSAIDS or Steroids    Allergies   Allergen Reactions     Dogs      Penicillins       Latex Allergy: NO    Social History   Substance Use Topics     Smoking status: Former Smoker     Types: Cigars     Smokeless tobacco: Never Used      Comment: a cigar every 4 months     Alcohol use 0.0 oz/week     0 Standard drinks or equivalent per week      Comment: rarely     History   Drug Use No       REVIEW OF SYSTEMS:                                                     C: NEGATIVE for fever, chills, change in weight  I: NEGATIVE for worrisome rashes, moles or lesions  E: NEGATIVE for vision changes or irritation  E/M: NEGATIVE for ear, mouth and throat problems  R: NEGATIVE for significant cough or SOB  B: NEGATIVE for masses, tenderness or discharge  CV: NEGATIVE for chest pain, palpitations or peripheral edema  GI: NEGATIVE for nausea, abdominal pain, heartburn, or change in bowel habits  : NEGATIVE for frequency, dysuria, or hematuria  M: NEGATIVE for significant arthralgias or myalgia  N: NEGATIVE for weakness, dizziness or paresthesias  E: NEGATIVE for temperature intolerance, skin/hair changes  H: NEGATIVE for bleeding problems  P: NEGATIVE for changes in mood or affect    EXAM:                                                    There were no vitals taken for this visit.    GENERAL APPEARANCE: healthy, alert and no distress     EYES: EOMI,  PERRL     HENT: ear canals and TM's normal and nose and mouth without ulcers or lesions     NECK: no adenopathy, no asymmetry, masses, or scars and thyroid normal to palpation     RESP: lungs clear to auscultation - no rales, rhonchi or wheezes     CV: regular rates and rhythm, normal S1 S2, no S3 or S4 and no murmur, click or rub     ABDOMEN:  soft, nontender, no HSM or masses and bowel sounds normal     MS: extremities normal- no gross deformities noted, no evidence of inflammation in joints, FROM in all extremities.     SKIN: no suspicious lesions or rashes     NEURO: Normal strength and tone, sensory exam grossly normal, mentation intact and speech normal     PSYCH: mentation appears normal. and affect normal/bright     LYMPHATICS: No axillary, cervical, or supraclavicular nodes    DIAGNOSTICS:                                                      Labs Resulted Today:   Results for orders placed or performed in visit on 11/09/17   *UA reflex to Microscopic and Culture ([facility] and [place]  [facility] (except [place] and [place])   Result Value Ref [facility]    Color Urine Yellow     Appearance Urine Clear     Glucose Urine Negative NEG^Negative mg/dL    Bilirubin Urine Negative NEG^Negative    Ketones Urine Negative NEG^Negative mg/dL    Specific Gravity Urine 1.010 1.003 - 1.035    Blood Urine Negative NEG^Negative    pH Urine 6.5 5.0 - 7.0 pH    Protein Albumin Urine Negative NEG^Negative mg/dL    Urobilinogen Urine 0.2 0.2 - 1.0 EU/dL    Nitrite Urine Negative NEG^Negative    Leukocyte Esterase Urine Negative NEG^Negative    Source Midstream Urine        Recent Labs   Lab Test  04/03/17   0942  12/28/09   1311   HGB  14.2  14.8   PLT  244  251   NA  142  139   POTASSIUM  4.2  4.0   CR  0.79  0.79        IMPRESSION:                                                    Reason for surgery/procedure: hernia surgery  Diagnosis/reason for consult: pre op.    The proposed surgical procedure is considered INTERMEDIATE risk.    REVISED CARDIAC RISK INDEX  The patient has the following serious cardiovascular risks for perioperative complications such as (MI, PE, VFib and 3  AV Block):  No serious cardiac risks  INTERPRETATION: 0 risks: Class I (very low risk - 0.4% complication rate)    The patient has the following additional risks for perioperative complications:  No identified additional risks    No diagnosis found.    RECOMMENDATIONS:                                                              APPROVAL GIVEN to proceed with proposed procedure, without further diagnostic evaluation       Signed Electronically by: Arline Miller MD    Copy of this evaluation report is provided to requesting physician.    [facility] Preop Guidelines

## 2017-11-17 NOTE — H&P (VIEW-ONLY)
[facility]  [address] [facility] [address]  [phone]  Dept: [phone]    PRE-OP EVALUATION:  Today's date: 2017    Bill Worrell (: 1970) presents for pre-operative evaluation assessment as requested by Dr. Kae Newberry.  He requires evaluation and anesthesia risk assessment prior to undergoing surgery/procedure for treatment of Hernia  .  Proposed procedure:     Date of Surgery/ Procedure: 17  Time of Surgery/ Procedure: 10:30 am   Hospital/Surgical Facility: [facility]     Primary Physician: Arline Miller  Type of Anesthesia Anticipated: General    Patient has a Health Care Directive or Living Will:  YES     Preop Questions 2017   1.  Do you have a history of heart attack, stroke, stent, bypass or surgery on an artery in the head, neck, heart or legs? No   2.  Do you ever have any pain or discomfort in your chest? No   3.  Do you have a history of  Heart Failure? No   4.   Are you troubled by shortness of breath when:  walking on a level surface, or up a slight hill, or at night? No   5.  Do you currently have a cold, bronchitis or other respiratory infection? No   6.  Do you have a cough, shortness of breath, or wheezing? No   7.  Do you sometimes get pains in the calves of your legs when you walk? No   8. Do you or anyone in your family have previous history of blood clots? No   9.  Do you or does anyone in your family have a serious bleeding problem such as prolonged bleeding following surgeries or cuts? No   10. Have you ever had problems with anemia or been told to take iron pills? No   11. Have you had any abnormal blood loss such as black, tarry or bloody stools? No   12. Have you ever had a blood transfusion? No   13. Have you or any of your relatives ever had problems with anesthesia? No   14. Do you have sleep apnea, excessive snoring or daytime drowsiness? No   15. Do you have any prosthetic heart valves? No   16.  Do you have prosthetic joints? No           HPI:                                                      Brief HPI related to upcoming procedure: inguinal hernia.      HYPOTHYROIDISM - Patient has a longstanding history of chronic Hypothyroidism. Patient has been doing well, noting no tremor, insomnia, hair loss or changes in skin texture. Last TSH value of normal.. Continues to take medications as directed, without adverse reactions or side effects.                                                                                                                                                                                                                        .    MEDICAL HISTORY:                                                    Patient Active Problem List    Diagnosis Date Noted     Hypothyroidism due to Hashimoto's thyroiditis 07/10/2017     Priority: Medium     Intrinsic eczema 07/10/2017     Priority: Medium     Hyperlipidemia LDL goal <160 06/13/2011     Priority: Medium     Mixed hyperlipidemia 10/19/2006     Priority: Medium      Past Medical History:   Diagnosis Date     Hypothyroidism 7/14/2010     Hypothyroidism due to Hashimoto's thyroiditis 7/10/2017     Intrinsic eczema 7/10/2017     Mixed hyperlipidemia 10/19/2006     Psoriasis 7/14/2010     No past surgical history on file.  Current Outpatient Prescriptions   Medication Sig Dispense Refill     levothyroxine (SYNTHROID/LEVOTHROID) 100 MCG tablet TAKE 1 TABLET (100 MCG) BY MOUTH DAILY 90 tablet 3     OTC products: no recent use of OTC ASA, NSAIDS or Steroids    Allergies   Allergen Reactions     Dogs      Penicillins       Latex Allergy: NO    Social History   Substance Use Topics     Smoking status: Former Smoker     Types: Cigars     Smokeless tobacco: Never Used      Comment: a cigar every 4 months     Alcohol use 0.0 oz/week     0 Standard drinks or equivalent per week      Comment: rarely     History   Drug Use No       REVIEW OF SYSTEMS:                                                     C: NEGATIVE for fever, chills, change in weight  I: NEGATIVE for worrisome rashes, moles or lesions  E: NEGATIVE for vision changes or irritation  E/M: NEGATIVE for ear, mouth and throat problems  R: NEGATIVE for significant cough or SOB  B: NEGATIVE for masses, tenderness or discharge  CV: NEGATIVE for chest pain, palpitations or peripheral edema  GI: NEGATIVE for nausea, abdominal pain, heartburn, or change in bowel habits  : NEGATIVE for frequency, dysuria, or hematuria  M: NEGATIVE for significant arthralgias or myalgia  N: NEGATIVE for weakness, dizziness or paresthesias  E: NEGATIVE for temperature intolerance, skin/hair changes  H: NEGATIVE for bleeding problems  P: NEGATIVE for changes in mood or affect    EXAM:                                                    There were no vitals taken for this visit.    GENERAL APPEARANCE: healthy, alert and no distress     EYES: EOMI,  PERRL     HENT: ear canals and TM's normal and nose and mouth without ulcers or lesions     NECK: no adenopathy, no asymmetry, masses, or scars and thyroid normal to palpation     RESP: lungs clear to auscultation - no rales, rhonchi or wheezes     CV: regular rates and rhythm, normal S1 S2, no S3 or S4 and no murmur, click or rub     ABDOMEN:  soft, nontender, no HSM or masses and bowel sounds normal     MS: extremities normal- no gross deformities noted, no evidence of inflammation in joints, FROM in all extremities.     SKIN: no suspicious lesions or rashes     NEURO: Normal strength and tone, sensory exam grossly normal, mentation intact and speech normal     PSYCH: mentation appears normal. and affect normal/bright     LYMPHATICS: No axillary, cervical, or supraclavicular nodes    DIAGNOSTICS:                                                      Labs Resulted Today:   Results for orders placed or performed in visit on 11/09/17   *UA reflex to Microscopic and Culture ([facility] and [place]  [facility] (except [place] and [place])   Result Value Ref [facility]    Color Urine Yellow     Appearance Urine Clear     Glucose Urine Negative NEG^Negative mg/dL    Bilirubin Urine Negative NEG^Negative    Ketones Urine Negative NEG^Negative mg/dL    Specific Gravity Urine 1.010 1.003 - 1.035    Blood Urine Negative NEG^Negative    pH Urine 6.5 5.0 - 7.0 pH    Protein Albumin Urine Negative NEG^Negative mg/dL    Urobilinogen Urine 0.2 0.2 - 1.0 EU/dL    Nitrite Urine Negative NEG^Negative    Leukocyte Esterase Urine Negative NEG^Negative    Source Midstream Urine        Recent Labs   Lab Test  04/03/17   0942  12/28/09   1311   HGB  14.2  14.8   PLT  244  251   NA  142  139   POTASSIUM  4.2  4.0   CR  0.79  0.79        IMPRESSION:                                                    Reason for surgery/procedure: hernia surgery  Diagnosis/reason for consult: pre op.    The proposed surgical procedure is considered INTERMEDIATE risk.    REVISED CARDIAC RISK INDEX  The patient has the following serious cardiovascular risks for perioperative complications such as (MI, PE, VFib and 3  AV Block):  No serious cardiac risks  INTERPRETATION: 0 risks: Class I (very low risk - 0.4% complication rate)    The patient has the following additional risks for perioperative complications:  No identified additional risks    No diagnosis found.    RECOMMENDATIONS:                                                              APPROVAL GIVEN to proceed with proposed procedure, without further diagnostic evaluation       Signed Electronically by: Arline Miller MD    Copy of this evaluation report is provided to requesting physician.    [facility] Preop Guidelines

## 2017-11-22 ENCOUNTER — HOSPITAL ENCOUNTER (OUTPATIENT)
Facility: CLINIC | Age: 47
Discharge: HOME | End: 2017-11-22
Attending: SURGERY | Admitting: SURGERY
Payer: COMMERCIAL

## 2017-11-22 ENCOUNTER — ANESTHESIA EVENT (OUTPATIENT)
Dept: SURGERY | Facility: CLINIC | Age: 47
End: 2017-11-22
Payer: COMMERCIAL

## 2017-11-22 ENCOUNTER — OFFICE VISIT (OUTPATIENT)
Dept: SURGERY | Facility: PHYSICIAN GROUP | Age: 47
End: 2017-11-22
Payer: COMMERCIAL

## 2017-11-22 ENCOUNTER — ANESTHESIA (OUTPATIENT)
Dept: SURGERY | Facility: CLINIC | Age: 47
End: 2017-11-22
Payer: COMMERCIAL

## 2017-11-22 ENCOUNTER — SURGERY (OUTPATIENT)
Age: 47
End: 2017-11-22

## 2017-11-22 VITALS
WEIGHT: 181.63 LBS | OXYGEN SATURATION: 99 % | BODY MASS INDEX: 30.26 KG/M2 | RESPIRATION RATE: 14 BRPM | HEIGHT: 65 IN | DIASTOLIC BLOOD PRESSURE: 86 MMHG | HEART RATE: 57 BPM | TEMPERATURE: 96.3 F | SYSTOLIC BLOOD PRESSURE: 134 MMHG

## 2017-11-22 DIAGNOSIS — K40.20 NON-RECURRENT BILATERAL INGUINAL HERNIA WITHOUT OBSTRUCTION OR GANGRENE: Primary | ICD-10-CM

## 2017-11-22 PROCEDURE — 25000125 ZZHC RX 250: Performed by: SURGERY

## 2017-11-22 PROCEDURE — 37000008 ZZH ANESTHESIA TECHNICAL FEE, 1ST 30 MIN: Performed by: SURGERY

## 2017-11-22 PROCEDURE — 40000170 ZZH STATISTIC PRE-PROCEDURE ASSESSMENT II: Performed by: SURGERY

## 2017-11-22 PROCEDURE — C1781 MESH (IMPLANTABLE): HCPCS | Performed by: SURGERY

## 2017-11-22 PROCEDURE — 71000012 ZZH RECOVERY PHASE 1 LEVEL 1 FIRST HR: Performed by: SURGERY

## 2017-11-22 PROCEDURE — 25000125 ZZHC RX 250: Performed by: NURSE ANESTHETIST, CERTIFIED REGISTERED

## 2017-11-22 PROCEDURE — 49650 LAP ING HERNIA REPAIR INIT: CPT | Mod: 50 | Performed by: SURGERY

## 2017-11-22 PROCEDURE — 71000027 ZZH RECOVERY PHASE 2 EACH 15 MINS: Performed by: SURGERY

## 2017-11-22 PROCEDURE — S0077 INJECTION, CLINDAMYCIN PHOSP: HCPCS | Performed by: SURGERY

## 2017-11-22 PROCEDURE — S0020 INJECTION, BUPIVICAINE HYDRO: HCPCS | Performed by: SURGERY

## 2017-11-22 PROCEDURE — 25000132 ZZH RX MED GY IP 250 OP 250 PS 637: Performed by: SURGERY

## 2017-11-22 PROCEDURE — 36000058 ZZH SURGERY LEVEL 3 EA 15 ADDTL MIN: Performed by: SURGERY

## 2017-11-22 PROCEDURE — 36000056 ZZH SURGERY LEVEL 3 1ST 30 MIN: Performed by: SURGERY

## 2017-11-22 PROCEDURE — 27210794 ZZH OR GENERAL SUPPLY STERILE: Performed by: SURGERY

## 2017-11-22 PROCEDURE — 37000009 ZZH ANESTHESIA TECHNICAL FEE, EACH ADDTL 15 MIN: Performed by: SURGERY

## 2017-11-22 PROCEDURE — 25000566 ZZH SEVOFLURANE, EA 15 MIN: Performed by: SURGERY

## 2017-11-22 PROCEDURE — 25000128 H RX IP 250 OP 636: Performed by: NURSE ANESTHETIST, CERTIFIED REGISTERED

## 2017-11-22 DEVICE — MESH PROGRIP LAPAROSCOPIC 5.9X3.9" PARIETEX SELF-FIX LPG1510: Type: IMPLANTABLE DEVICE | Site: INGUINAL | Status: FUNCTIONAL

## 2017-11-22 RX ORDER — FENTANYL CITRATE 50 UG/ML
25-50 INJECTION, SOLUTION INTRAMUSCULAR; INTRAVENOUS
Status: DISCONTINUED | OUTPATIENT
Start: 2017-11-22 | End: 2017-11-22 | Stop reason: HOSPADM

## 2017-11-22 RX ORDER — NALOXONE HYDROCHLORIDE 0.4 MG/ML
.1-.4 INJECTION, SOLUTION INTRAMUSCULAR; INTRAVENOUS; SUBCUTANEOUS
Status: DISCONTINUED | OUTPATIENT
Start: 2017-11-22 | End: 2017-11-22 | Stop reason: HOSPADM

## 2017-11-22 RX ORDER — BUPIVACAINE HYDROCHLORIDE 5 MG/ML
INJECTION, SOLUTION PERINEURAL PRN
Status: DISCONTINUED | OUTPATIENT
Start: 2017-11-22 | End: 2017-11-22 | Stop reason: HOSPADM

## 2017-11-22 RX ORDER — ONDANSETRON 2 MG/ML
INJECTION INTRAMUSCULAR; INTRAVENOUS PRN
Status: DISCONTINUED | OUTPATIENT
Start: 2017-11-22 | End: 2017-11-22

## 2017-11-22 RX ORDER — MEPERIDINE HYDROCHLORIDE 25 MG/ML
12.5 INJECTION INTRAMUSCULAR; INTRAVENOUS; SUBCUTANEOUS
Status: DISCONTINUED | OUTPATIENT
Start: 2017-11-22 | End: 2017-11-22 | Stop reason: HOSPADM

## 2017-11-22 RX ORDER — CLINDAMYCIN PHOSPHATE 900 MG/50ML
900 INJECTION, SOLUTION INTRAVENOUS
Status: COMPLETED | OUTPATIENT
Start: 2017-11-22 | End: 2017-11-22

## 2017-11-22 RX ORDER — DEXAMETHASONE SODIUM PHOSPHATE 4 MG/ML
INJECTION, SOLUTION INTRA-ARTICULAR; INTRALESIONAL; INTRAMUSCULAR; INTRAVENOUS; SOFT TISSUE PRN
Status: DISCONTINUED | OUTPATIENT
Start: 2017-11-22 | End: 2017-11-22

## 2017-11-22 RX ORDER — OXYCODONE AND ACETAMINOPHEN 5; 325 MG/1; MG/1
1 TABLET ORAL
Status: COMPLETED | OUTPATIENT
Start: 2017-11-22 | End: 2017-11-22

## 2017-11-22 RX ORDER — SODIUM CHLORIDE, SODIUM LACTATE, POTASSIUM CHLORIDE, CALCIUM CHLORIDE 600; 310; 30; 20 MG/100ML; MG/100ML; MG/100ML; MG/100ML
INJECTION, SOLUTION INTRAVENOUS CONTINUOUS PRN
Status: DISCONTINUED | OUTPATIENT
Start: 2017-11-22 | End: 2017-11-22

## 2017-11-22 RX ORDER — NEOSTIGMINE METHYLSULFATE 1 MG/ML
VIAL (ML) INJECTION PRN
Status: DISCONTINUED | OUTPATIENT
Start: 2017-11-22 | End: 2017-11-22

## 2017-11-22 RX ORDER — AMOXICILLIN 250 MG
1-2 CAPSULE ORAL 2 TIMES DAILY
Qty: 20 TABLET | Refills: 0 | Status: SHIPPED | OUTPATIENT
Start: 2017-11-22 | End: 2018-06-13

## 2017-11-22 RX ORDER — FENTANYL CITRATE 50 UG/ML
INJECTION, SOLUTION INTRAMUSCULAR; INTRAVENOUS PRN
Status: DISCONTINUED | OUTPATIENT
Start: 2017-11-22 | End: 2017-11-22

## 2017-11-22 RX ORDER — FENTANYL CITRATE 50 UG/ML
25-50 INJECTION, SOLUTION INTRAMUSCULAR; INTRAVENOUS EVERY 5 MIN PRN
Status: DISCONTINUED | OUTPATIENT
Start: 2017-11-22 | End: 2017-11-22 | Stop reason: HOSPADM

## 2017-11-22 RX ORDER — EPHEDRINE SULFATE 50 MG/ML
INJECTION, SOLUTION INTRAMUSCULAR; INTRAVENOUS; SUBCUTANEOUS PRN
Status: DISCONTINUED | OUTPATIENT
Start: 2017-11-22 | End: 2017-11-22

## 2017-11-22 RX ORDER — OXYCODONE AND ACETAMINOPHEN 5; 325 MG/1; MG/1
1-2 TABLET ORAL EVERY 4 HOURS PRN
Qty: 30 TABLET | Refills: 0 | Status: SHIPPED | OUTPATIENT
Start: 2017-11-22 | End: 2018-06-13

## 2017-11-22 RX ORDER — GLYCOPYRROLATE 0.2 MG/ML
INJECTION, SOLUTION INTRAMUSCULAR; INTRAVENOUS PRN
Status: DISCONTINUED | OUTPATIENT
Start: 2017-11-22 | End: 2017-11-22

## 2017-11-22 RX ORDER — SODIUM CHLORIDE, SODIUM LACTATE, POTASSIUM CHLORIDE, CALCIUM CHLORIDE 600; 310; 30; 20 MG/100ML; MG/100ML; MG/100ML; MG/100ML
INJECTION, SOLUTION INTRAVENOUS CONTINUOUS
Status: DISCONTINUED | OUTPATIENT
Start: 2017-11-22 | End: 2017-11-22 | Stop reason: HOSPADM

## 2017-11-22 RX ORDER — PROPOFOL 10 MG/ML
INJECTION, EMULSION INTRAVENOUS CONTINUOUS PRN
Status: DISCONTINUED | OUTPATIENT
Start: 2017-11-22 | End: 2017-11-22

## 2017-11-22 RX ORDER — FENTANYL CITRATE 50 UG/ML
50-100 INJECTION, SOLUTION INTRAMUSCULAR; INTRAVENOUS
Status: DISCONTINUED | OUTPATIENT
Start: 2017-11-22 | End: 2017-11-22 | Stop reason: HOSPADM

## 2017-11-22 RX ORDER — ONDANSETRON 2 MG/ML
4 INJECTION INTRAMUSCULAR; INTRAVENOUS EVERY 30 MIN PRN
Status: DISCONTINUED | OUTPATIENT
Start: 2017-11-22 | End: 2017-11-22 | Stop reason: HOSPADM

## 2017-11-22 RX ORDER — PROPOFOL 10 MG/ML
INJECTION, EMULSION INTRAVENOUS PRN
Status: DISCONTINUED | OUTPATIENT
Start: 2017-11-22 | End: 2017-11-22

## 2017-11-22 RX ORDER — HYDROMORPHONE HYDROCHLORIDE 1 MG/ML
.3-.5 INJECTION, SOLUTION INTRAMUSCULAR; INTRAVENOUS; SUBCUTANEOUS EVERY 10 MIN PRN
Status: DISCONTINUED | OUTPATIENT
Start: 2017-11-22 | End: 2017-11-22 | Stop reason: HOSPADM

## 2017-11-22 RX ORDER — ONDANSETRON 4 MG/1
4 TABLET, ORALLY DISINTEGRATING ORAL EVERY 30 MIN PRN
Status: DISCONTINUED | OUTPATIENT
Start: 2017-11-22 | End: 2017-11-22 | Stop reason: HOSPADM

## 2017-11-22 RX ORDER — CLINDAMYCIN PHOSPHATE 900 MG/50ML
900 INJECTION, SOLUTION INTRAVENOUS SEE ADMIN INSTRUCTIONS
Status: DISCONTINUED | OUTPATIENT
Start: 2017-11-22 | End: 2017-11-22 | Stop reason: HOSPADM

## 2017-11-22 RX ORDER — KETOROLAC TROMETHAMINE 30 MG/ML
INJECTION, SOLUTION INTRAMUSCULAR; INTRAVENOUS PRN
Status: DISCONTINUED | OUTPATIENT
Start: 2017-11-22 | End: 2017-11-22

## 2017-11-22 RX ORDER — ACETAMINOPHEN 325 MG/1
650 TABLET ORAL
Status: DISCONTINUED | OUTPATIENT
Start: 2017-11-22 | End: 2017-11-22 | Stop reason: HOSPADM

## 2017-11-22 RX ORDER — BUPIVACAINE HYDROCHLORIDE 5 MG/ML
INJECTION, SOLUTION EPIDURAL; INTRACAUDAL
Status: DISCONTINUED
Start: 2017-11-22 | End: 2017-11-22 | Stop reason: HOSPADM

## 2017-11-22 RX ADMIN — ONDANSETRON 4 MG: 2 INJECTION INTRAMUSCULAR; INTRAVENOUS at 11:47

## 2017-11-22 RX ADMIN — ROCURONIUM BROMIDE 15 MG: 10 INJECTION INTRAVENOUS at 11:31

## 2017-11-22 RX ADMIN — OXYCODONE HYDROCHLORIDE AND ACETAMINOPHEN 1 TABLET: 5; 325 TABLET ORAL at 12:58

## 2017-11-22 RX ADMIN — GLYCOPYRROLATE 0.8 MG: 0.2 INJECTION, SOLUTION INTRAMUSCULAR; INTRAVENOUS at 11:57

## 2017-11-22 RX ADMIN — LIDOCAINE HYDROCHLORIDE 10 ML: 20 JELLY TOPICAL at 10:40

## 2017-11-22 RX ADMIN — NEOSTIGMINE METHYLSULFATE 5 MG: 1 INJECTION INTRAMUSCULAR; INTRAVENOUS; SUBCUTANEOUS at 11:57

## 2017-11-22 RX ADMIN — SODIUM CHLORIDE, POTASSIUM CHLORIDE, SODIUM LACTATE AND CALCIUM CHLORIDE: 600; 310; 30; 20 INJECTION, SOLUTION INTRAVENOUS at 10:24

## 2017-11-22 RX ADMIN — PROPOFOL 50 MCG/KG/MIN: 10 INJECTION, EMULSION INTRAVENOUS at 10:27

## 2017-11-22 RX ADMIN — MIDAZOLAM HYDROCHLORIDE 2 MG: 1 INJECTION, SOLUTION INTRAMUSCULAR; INTRAVENOUS at 10:25

## 2017-11-22 RX ADMIN — PROPOFOL 200 MG: 10 INJECTION, EMULSION INTRAVENOUS at 10:27

## 2017-11-22 RX ADMIN — Medication 5 MG: at 10:39

## 2017-11-22 RX ADMIN — BUPIVACAINE HYDROCHLORIDE 30 ML: 5 INJECTION, SOLUTION PERINEURAL at 11:50

## 2017-11-22 RX ADMIN — SODIUM CHLORIDE, POTASSIUM CHLORIDE, SODIUM LACTATE AND CALCIUM CHLORIDE: 600; 310; 30; 20 INJECTION, SOLUTION INTRAVENOUS at 12:01

## 2017-11-22 RX ADMIN — CLINDAMYCIN PHOSPHATE 900 MG: 18 INJECTION, SOLUTION INTRAVENOUS at 10:30

## 2017-11-22 RX ADMIN — KETOROLAC TROMETHAMINE 15 MG: 30 INJECTION, SOLUTION INTRAMUSCULAR at 11:51

## 2017-11-22 RX ADMIN — DEXAMETHASONE SODIUM PHOSPHATE 4 MG: 4 INJECTION, SOLUTION INTRA-ARTICULAR; INTRALESIONAL; INTRAMUSCULAR; INTRAVENOUS; SOFT TISSUE at 10:41

## 2017-11-22 RX ADMIN — FENTANYL CITRATE 100 MCG: 50 INJECTION, SOLUTION INTRAMUSCULAR; INTRAVENOUS at 10:27

## 2017-11-22 RX ADMIN — Medication 5 MG: at 10:49

## 2017-11-22 RX ADMIN — ROCURONIUM BROMIDE 50 MG: 10 INJECTION INTRAVENOUS at 10:27

## 2017-11-22 RX ADMIN — ROCURONIUM BROMIDE 10 MG: 10 INJECTION INTRAVENOUS at 11:38

## 2017-11-22 ASSESSMENT — ENCOUNTER SYMPTOMS
DYSRHYTHMIAS: 0
SEIZURES: 0
ORTHOPNEA: 0

## 2017-11-22 ASSESSMENT — COPD QUESTIONNAIRES: COPD: 0

## 2017-11-22 ASSESSMENT — LIFESTYLE VARIABLES: TOBACCO_USE: 0

## 2017-11-22 NOTE — IP AVS SNAPSHOT
MRN:[id number]                      After Visit Summary   11/22/2017    Bill Worrell    MRN: [id number]           Thank you!     Thank you for choosing [facility] for your care. Our goal is always to provide you with excellent care. Hearing back from our patients is one way we can continue to improve our services. Please take a few minutes to complete the written survey that you may receive in the mail after you visit with us. Thank you!        Patient Information     Date Of Birth          1970        About your hospital stay     You were admitted on:  November 22, 2017 You last received care in the:  [facility] Same Day Surgery    You were discharged on:  November 22, 2017       Who to Call     For medical emergencies, please call 911.  For non-urgent questions about your medical care, please call your primary care provider or clinic, [phone]  For questions related to your surgery, please call your surgery clinic        Attending Provider     Provider Specialty    Kae Newberry MD Surgery       Primary Care Provider Office Phone # Fax #    Arline Miller [phone]      After Care Instructions     Ice to affected area       Ice to operative site PRN                  Further instructions from your care team       While you were at the hospital today you received Toradol, an antiinflammatory medication similar to Ibuprofen.  You should not take other antiinflammatory medication, such as Ibuprofen, Motrin, Advil, Aleve, Naprosyn, etc, until 5:50 PM.     [facility]  Discharge Instructions: Post-Operative Laparoscopic Inguinal Hernia    ACTIVITY    Increase your activity gradually.  Avoid strenuous physical activity or heavy lifting greater than 15lbs. for 4 weeks.  You may climb stairs.    You may drive without restrictions when you are not using any prescription pain medication and comfortable in a car.    You may return  to work/school when you are comfortable without any prescription pain medication.    WOUND CARE    You may remove your bandage and shower 48 hours after the surgery.  Pat your incisions dry and leave open to air.  Re-apply dressing (Band-aid or gauze/tape) as needed for drainage.    You may have steri-strips (looks like tape) or Dermabond (looks like glue) on your incision.  Leave it alone, it will peel up and fall off on its own.     Do not soak your incisions in a tub or pool for 2 weeks.     DIET    Return to the diet you were on before surgery.    Pain medications can cause constipation.  Limit use when possible.  Take over the counter stool softener/stimulant, such as Colace or Senna, with plenty of water.      PAIN    Expect some tenderness and discomfort at the incision site(s).  Use the prescribed pain medication at your discretion.  Expect gradual resolution of your pain over several days.    You may take ibuprofen with food (unless you have been told not to) instead of or in addition to your prescribed pain medication.  If you are taking Norco or Percocet, do not take any additional acetaminophen/APAP/Tylenol.    Do not drink alcohol or drive while you are taking pain medications.    You may apply ice to your incisions in 20 minute intervals as needed for the next 48 hours.  After that time, consider switching to heat if you prefer.    EXPECTATIONS    You can expect some swelling, bruising possibly involving the testicles and penis, and/or some numbness.  These symptoms are expected.  Use ice to help with the swelling.  Try placing a rolled hand towel below your scrotum to help alleviate your scrotal discomfort.     You may have shoulder or upper back discomfort due to the gas used in surgery.  This is temporary and should resolve in 48-72 hours.  Short, frequent walks may help with this.    RETURN APPOINTMENT    Follow up with your surgeon or a PA in 2 weeks.  Please call the office at [phone] to  schedule your appointment.    CALL OUR OFFICE IF YOU HAVE:     Chills or fever above 101.5 F.    Increased redness or drainage at your incisions.    Significant bleeding.    Pain not relieved by your pain medication or rest.    Increasing pain after the first 48 hours.  Any other concerns or questions.      Same Day Surgery Discharge Instructions for  Sedation and General Anesthesia       It's not unusual to feel dizzy, light-headed or faint for up to 24 hours after surgery or while taking pain medication.  If you have these symptoms: sit for a few minutes before standing and have someone assist you when you get up to walk or use the bathroom.      You should rest and relax for the next 24 hours. We recommend you make arrangements to have an adult stay with you for at least 24 hours after your discharge.  Avoid hazardous and strenuous activity.      DO NOT DRIVE any vehicle or operate mechanical equipment for 24 hours following the end of your surgery.  Even though you may feel normal, your reactions may be affected by the medication you have received.      Do not drink alcoholic beverages for 24 hours following surgery.       Slowly progress to your regular diet as you feel able. It's not unusual to feel nauseated and/or vomit after receiving anesthesia.  If you develop these symptoms, drink clear liquids (apple juice, ginger ale, broth, 7-up, etc. ) until you feel better.  If your nausea and vomiting persists for 24 hours, please notify your surgeon.        All narcotic pain medications, along with inactivity and anesthesia, can cause constipation. Drinking plenty of liquids and increasing fiber intake will help.      For any questions of a medical nature, call your surgeon.      Do not make important decisions for 24 hours.      If you had general anesthesia, you may have a sore throat for a couple of days related to the breathing tube used during surgery.  You may use Cepacol lozenges to help with this  "discomfort.  If it worsens or if you develop a fever, contact your surgeon.       If you feel your pain is not well managed with the pain medications prescribed by your surgeon, please contact your surgeon's office to let them know so they can address your concerns.       **If you have concerns or questions about your procedure,    please contact Dr Newberry at  [phone]**    Pending Results     No orders found from 11/20/2017 to 11/23/2017.            Admission Information     Date & Time Provider Department Dept. Phone    11/22/2017 Kae Newberry MD [facility] Same Day Surgery [phone]      Your Vitals Were     Blood Pressure Pulse Temperature Respirations Height Weight    128/81 57 96.3  F (35.7  C) 18 1.651 m (5' 5\") 82.4 kg (181 lb 10.1 oz)    Pulse Oximetry BMI (Body Mass Index)                96% 30.22 kg/m2          MyChart Information     [facility] gives you secure access to your electronic health record. If you see a primary care provider, you can also send messages to your care team and make appointments. If you have questions, please call your primary care clinic.  If you do not have a primary care provider, please call [phone] and they will assist you.        Care EveryWhere ID     This is your Care EveryWhere ID. This could be used by other organizations to access your [place] medical records  [id number]        Equal Access to Services     KARIE FIGUEROA AH: Hadii marcella Becerra, waaxda luqadaha, qaybta kaalcarlos tucker. So [facility] [phone].    ATENCIÓN: Si habla español, tiene a eid disposición servicios gratuitos de asistencia lingüística. Michele al [phone].    We comply with applicable federal civil rights laws and [place] laws. We do not discriminate on the basis of race, color, national origin, age, disability, sex, sexual orientation, or gender identity.               Review of your medicines      START taking     "    Dose / Directions    oxyCODONE-acetaminophen 5-325 MG per tablet   Commonly known as:  PERCOCET   Used for:  Non-recurrent bilateral inguinal hernia without obstruction or gangrene   Notes to Patient:  You had one tablet at 1:00 pm        Dose:  1-2 tablet   Take 1-2 tablets by mouth every 4 hours as needed for pain (moderate to severe)   Quantity:  30 tablet   Refills:  0       senna-docusate 8.6-50 MG per tablet   Commonly known as:  SENOKOT-S;PERICOLACE   Used for:  Non-recurrent bilateral inguinal hernia without obstruction or gangrene        Dose:  1-2 tablet   Take 1-2 tablets by mouth 2 times daily Take while on oral narcotics to prevent or treat constipation.   Quantity:  20 tablet   Refills:  0         CONTINUE these medicines which have NOT CHANGED        Dose / Directions    levothyroxine 100 MCG tablet   Commonly known as:  SYNTHROID/LEVOTHROID   Used for:  Hypothyroidism due to Hashimoto's thyroiditis        TAKE 1 TABLET (100 MCG) BY MOUTH DAILY   Quantity:  90 tablet   Refills:  3            Where to get your medicines      These medications were sent to [facility] [place] - [address]  [address] [facility] [address], [facility] [address]     Phone:  [phone]     senna-docusate 8.6-50 MG per tablet         Some of these will need a paper prescription and others can be bought over the counter. Ask your nurse if you have questions.     Bring a paper prescription for each of these medications     oxyCODONE-acetaminophen 5-325 MG per tablet                Protect others around you: Learn how to safely use, store and throw away your medicines at www.disposemymeds.org.             Medication List: This is a list of all your medications and when to take them. Check marks below indicate your daily home schedule. Keep this list as a reference.      Medications           Morning Afternoon Evening Bedtime As Needed    levothyroxine 100 MCG tablet   Commonly known as:   SYNTHROID/LEVOTHROID   TAKE 1 TABLET (100 MCG) BY MOUTH DAILY                                oxyCODONE-acetaminophen 5-325 MG per tablet   Commonly known as:  PERCOCET   Take 1-2 tablets by mouth every 4 hours as needed for pain (moderate to severe)   Last time this was given:  1 tablet on 11/22/2017 12:58 PM   Notes to Patient:  You had one tablet at 1:00 pm                                senna-docusate 8.6-50 MG per tablet   Commonly known as:  SENOKOT-S;PERICOLACE   Take 1-2 tablets by mouth 2 times daily Take while on oral narcotics to prevent or treat constipation.

## 2017-11-22 NOTE — IP AVS SNAPSHOT
[facility] Same Day Surgery    [address]    [place] [address]    Phone:  [phone]    Fax:  [phone]                                       After Visit Summary   11/22/2017    Bill Worrell    MRN: [id number]           After Visit Summary Signature Page     I have received my discharge instructions, and my questions have been answered. I have discussed any challenges I see with this plan with the nurse or doctor.    ..........................................................................................................................................  Patient/Patient Representative Signature      ..........................................................................................................................................  Patient Representative Print Name and Relationship to Patient    ..................................................               ................................................  Date                                            Time    ..........................................................................................................................................  Reviewed by Signature/Title    ...................................................              ..............................................  Date                                                            Time

## 2017-11-22 NOTE — ANESTHESIA CARE TRANSFER NOTE
Patient: Bill Worrell    Procedure(s):  LAPAROSCOPIC BILATERAL INGUINAL HERNIA REPAIR WITH MESH - Wound Class: I-Clean    Diagnosis: BILATERAL INGUINAL HERNIA  Diagnosis Additional Information: No value filed.    Anesthesia Type:   General, ETT     Note:  Airway :Face Mask  Patient transferred to:PACU  Comments: Spont. Resps, pt. Responding.  Extubated, sufficient air exchange. To PACU VSS, Monitors on. Report to RNHandoff Report: Identifed the Patient, Identified the Reponsible Provider, Reviewed the pertinent medical history, Discussed the surgical course, Reviewed Intra-OP anesthesia mangement and issues during anesthesia, Set expectations for post-procedure period and Allowed opportunity for questions and acknowledgement of understanding      Vitals: (Last set prior to Anesthesia Care Transfer)    CRNA VITALS  11/22/2017 1142 - 11/22/2017 1217      11/22/2017             Pulse: 99    SpO2: 100 %    Resp Rate (set): 10                Electronically Signed By: SADIA Ferraro CRNA  November 22, 2017  12:17 PM

## 2017-11-22 NOTE — OP NOTE
General Surgery Operative Note    PREOPERATIVE DIAGNOSIS:  Bilateral inguinal hernias    POSTOPERATIVE DIAGNOSIS:  Bilateral inguinal hernias    PROCEDURE:  Laparoscopic bilateral inguinal hernia repair with mesh    ANESTHESIA:  General.    SURGEON:  Kae Newberry MD    ASSISTANT:   Eva Correa MD [facility] Resident  The physician s assistant was medically necessary for their expertise in camera management, suctioning and retraction.    ESTIMATED BLOOD LOSS:  2ml    INTRAOPERATIVE FINDINGS:  Bilateral indirect inguinal hernias, right larger than left.     OPERATIVE INDICATIONS: Mr. Worrell has a symptomatic right inguinal hernia and an asymptomatic left inguinal hernia. Options were discussed and it was elected to proceed with laparoscopic repair. Potential risks of bleeding, infection, neurovascular injury to the vas deferens or testicle, recurrent hernia, chronic pain were all reviewed in detail and he wished to proceed.     DESCRIPTION OF PROCEDURE: The patient was taken to the operating suite and uneventfully endotracheally intubated. Perioperative antibiotics were given. The abdomen and groin were prepped and draped in the usual sterile fashion. Surgeon initiated timeout was performed verifying the correct patient, procedure, site, and surgeon. All in the room were in agreement. A mixture of lidocaine and marcaine was injected in the infra umbilical area.    A curvilinear incision was made at the underside of the umbilicus. Bovie cautery was used to get through the subcutaneous tissue. The anterior rectus sheath was encountered and sharply incised. The rectus muscle was retracted laterally and the dissecting balloon was passed along the posterior rectus sheath toward the pubic bone. The balloon was filled with air with the hand pump under direct visualization. The preperitoneal space was dissected nicely and the baloon held in place for 30 seconds for hemostasis. The dissecting balloon was then removed and  our working balloon was placed and positioned. Local was injected and Two 5 mm trocars were then placed along the lower midline under direct laparoscopic visualization. We began our dissection on the right side. Using combination of sharp and blunt dissection, a plane was created behind the abdominal wall and the underlying peritoneum. This was done in a blunt and atraumatic fashion. The inferior epigastric vessels were visualized running along the underside of the abdominal wall.  The epigastric vessels were followed down until we were able to identify the internal ring. The spermatic cord was then meticulously dissected preserving all of the nerve and blood vessels. A  large Indirect hernia was found and reduced. It was very tethered to the vas deferens and took a considerable amount of time to meticulously reduce.  Coopers ligament was then cleared without significant bleeding. The dissection was continued until we had an excellent space in the preperitoneal area. We then completed the same dissection on the left side. A small indirect hernia was noted on the left and reduced without difficulty. A piece of progrip laparoscopic mesh was then placed within this space, one on each side. The mesh was held in excellent position under direct visualization until the insufflation was completely out of the preperitoneal space.  All trocars were removed under direct visualization. The anterior fascia was closed with an 0 Vicryl in the figure of 8 fashion. Marcaine was once again injected to the jaci-wound area. The incisions were completely dry without any bleeding. The skin was closed with a 4.0 Monocryl in a subcuticular fashion.  Steri-Strips were applied. All needle, sponge and instrument counts were correct.  A debriefing was performed verifying the correct procedure, sponge/instrument count, specimen indetification, and blood loss. The patient tolerated the procedure well and was taken to the recovery room in stable  condition.    Kae Newberry MD

## 2017-11-22 NOTE — DISCHARGE INSTRUCTIONS
While you were at the hospital today you received Toradol, an antiinflammatory medication similar to Ibuprofen.  You should not take other antiinflammatory medication, such as Ibuprofen, Motrin, Advil, Aleve, Naprosyn, etc, until 5:50 PM.     [facility] - [facility]  Discharge Instructions: Post-Operative Laparoscopic Inguinal Hernia    ACTIVITY    Increase your activity gradually.  Avoid strenuous physical activity or heavy lifting greater than 15lbs. for 4 weeks.  You may climb stairs.    You may drive without restrictions when you are not using any prescription pain medication and comfortable in a car.    You may return to work/school when you are comfortable without any prescription pain medication.    WOUND CARE    You may remove your bandage and shower 48 hours after the surgery.  Pat your incisions dry and leave open to air.  Re-apply dressing (Band-aid or gauze/tape) as needed for drainage.    You may have steri-strips (looks like tape) or Dermabond (looks like glue) on your incision.  Leave it alone, it will peel up and fall off on its own.     Do not soak your incisions in a tub or pool for 2 weeks.     DIET    Return to the diet you were on before surgery.    Pain medications can cause constipation.  Limit use when possible.  Take over the counter stool softener/stimulant, such as Colace or Senna, with plenty of water.      PAIN    Expect some tenderness and discomfort at the incision site(s).  Use the prescribed pain medication at your discretion.  Expect gradual resolution of your pain over several days.    You may take ibuprofen with food (unless you have been told not to) instead of or in addition to your prescribed pain medication.  If you are taking Norco or Percocet, do not take any additional acetaminophen/APAP/Tylenol.    Do not drink alcohol or drive while you are taking pain medications.    You may apply ice to your incisions in 20 minute intervals as needed for the  next 48 hours.  After that time, consider switching to heat if you prefer.    EXPECTATIONS    You can expect some swelling, bruising possibly involving the testicles and penis, and/or some numbness.  These symptoms are expected.  Use ice to help with the swelling.  Try placing a rolled hand towel below your scrotum to help alleviate your scrotal discomfort.     You may have shoulder or upper back discomfort due to the gas used in surgery.  This is temporary and should resolve in 48-72 hours.  Short, frequent walks may help with this.    RETURN APPOINTMENT    Follow up with your surgeon or a PA in 2 weeks.  Please call the office at [phone] to schedule your appointment.    CALL OUR OFFICE IF YOU HAVE:     Chills or fever above 101.5 F.    Increased redness or drainage at your incisions.    Significant bleeding.    Pain not relieved by your pain medication or rest.    Increasing pain after the first 48 hours.  Any other concerns or questions.      Same Day Surgery Discharge Instructions for  Sedation and General Anesthesia       It's not unusual to feel dizzy, light-headed or faint for up to 24 hours after surgery or while taking pain medication.  If you have these symptoms: sit for a few minutes before standing and have someone assist you when you get up to walk or use the bathroom.      You should rest and relax for the next 24 hours. We recommend you make arrangements to have an adult stay with you for at least 24 hours after your discharge.  Avoid hazardous and strenuous activity.      DO NOT DRIVE any vehicle or operate mechanical equipment for 24 hours following the end of your surgery.  Even though you may feel normal, your reactions may be affected by the medication you have received.      Do not drink alcoholic beverages for 24 hours following surgery.       Slowly progress to your regular diet as you feel able. It's not unusual to feel nauseated and/or vomit after receiving anesthesia.  If you develop  these symptoms, drink clear liquids (apple juice, ginger ale, broth, 7-up, etc. ) until you feel better.  If your nausea and vomiting persists for 24 hours, please notify your surgeon.        All narcotic pain medications, along with inactivity and anesthesia, can cause constipation. Drinking plenty of liquids and increasing fiber intake will help.      For any questions of a medical nature, call your surgeon.      Do not make important decisions for 24 hours.      If you had general anesthesia, you may have a sore throat for a couple of days related to the breathing tube used during surgery.  You may use Cepacol lozenges to help with this discomfort.  If it worsens or if you develop a fever, contact your surgeon.       If you feel your pain is not well managed with the pain medications prescribed by your surgeon, please contact your surgeon's office to let them know so they can address your concerns.       **If you have concerns or questions about your procedure,    please contact Dr Newberry at  [phone]**

## 2017-11-22 NOTE — ANESTHESIA PREPROCEDURE EVALUATION
Procedure: Procedure(s):  LAPAROSCOPIC HERNIORRHAPHY INGUINAL BILATERAL  Preop diagnosis: BILATERAL INGUINAL HERNIA    Allergies   Allergen Reactions     Dogs      Penicillins      Past Medical History:   Diagnosis Date     Hypothyroidism 7/14/2010     Hypothyroidism due to Hashimoto's thyroiditis 7/10/2017     Intrinsic eczema 7/10/2017     Mixed hyperlipidemia 10/19/2006     Psoriasis 7/14/2010     History reviewed. No pertinent surgical history.  Social History   Substance Use Topics     Smoking status: Former Smoker     Types: Cigars     Smokeless tobacco: Never Used      Comment: a cigar every 4 months     Alcohol use 0.0 oz/week     0 Standard drinks or equivalent per week      Comment: rarely     Prior to Admission medications    Medication Sig Start Date End Date Taking? Authorizing Provider   levothyroxine (SYNTHROID/LEVOTHROID) 100 MCG tablet TAKE 1 TABLET (100 MCG) BY MOUTH DAILY 5/9/17  Yes Arline Miller MD     Current Facility-Administered Medications Ordered in Epic   Medication Dose Route Frequency Last Rate Last Dose     clindamycin (CLEOCIN) infusion 900 mg  900 mg Intravenous Pre-Op/Pre-procedure x 1 dose         clindamycin (CLEOCIN) infusion 900 mg  900 mg Intravenous See Admin Instructions         No current Psychiatric-ordered outpatient prescriptions on file.        Wt Readings from Last 1 Encounters:   11/22/17 82.4 kg (181 lb 10.1 oz)     Temp Readings from Last 1 Encounters:   11/22/17 36.6  C (97.9  F) (Temporal)     BP Readings from Last 6 Encounters:   11/22/17 131/72   11/09/17 113/73   11/03/17 112/62   11/02/17 110/72   07/10/17 110/78   04/03/17 124/75     Pulse Readings from Last 4 Encounters:   11/22/17 57   11/09/17 74   11/03/17 68   11/02/17 82     Resp Readings from Last 1 Encounters:   11/22/17 14     SpO2 Readings from Last 1 Encounters:   11/22/17 98%     Recent Labs   Lab Test  04/03/17   0942  05/14/16   0955   12/28/09   1311   NA  142   --    --   139   POTASSIUM  4.2   --    --    4.0   CHLORIDE  109   --    --   101   CO2  24   --    --   27   ANIONGAP  9   --    --   11   GLC  96  95   < >  93   BUN  14   --    --   14   CR  0.79   --    --   0.79   JUANI  8.9   --    --   9.6    < > = values in this interval not displayed.     Recent Labs   Lab Test  11/24/10   0937  07/15/10   0828   AST  36  52   ALT  45  78*   ALKPHOS  137  174*   BILITOTAL  0.7  0.8     Recent Labs   Lab Test  04/03/17   0942  12/28/09   1311   WBC  7.2  7.9   HGB  14.2  14.8   PLT  244  251     RECENT LABS:   ECG:   ECHO:     Anesthesia Evaluation     . Pt has not had prior anesthetic     No history of anesthetic complications (+ mild motion sickness)          ROS/MED HX    ENT/Pulmonary:      (-) tobacco use, asthma, COPD, sleep apnea and recent URI   Neurologic:      (-) seizures and CVA   Cardiovascular:        (-) hypertension, orthopnea/PND, syncope, arrhythmias, irregular heartbeat/palpitations and valvular problems/murmurs   METS/Exercise Tolerance:  >4 METS   Hematologic:         Musculoskeletal:         GI/Hepatic:        (-) GERD and liver disease   Renal/Genitourinary:      (-) renal disease   Endo:     (+) thyroid problem hypothyroidism, .      Psychiatric:         Infectious Disease:         Malignancy:         Other:                     Physical Exam  Normal systems: dental    Airway   Mallampati: II  TM distance: >3 FB  Neck ROM: full    Dental     Cardiovascular   Rhythm and rate: regular and normal  (-) no murmur    Pulmonary    breath sounds clear to auscultation(-) no wheezes                    Anesthesia Plan      History & Physical Review  History and physical reviewed and following examination; no interval change.    ASA Status:  2 .    NPO Status:  > 8 hours    Plan for General and ETT with Propofol and Intravenous induction. Maintenance will be Balanced.    PONV prophylaxis:  Ondansetron (or other 5HT-3), Dexamethasone or Solumedrol and Other (See comment) (propofol infusion; Zofran 8 mg )        Postoperative Care  Postoperative pain management:  Multi-modal analgesia.      Consents  Anesthetic plan, risks, benefits and alternatives discussed with:  Patient..

## 2017-11-22 NOTE — PROGRESS NOTES
Printed and verbal instructions given to patient and assigned care taker.  Patient and caretaker verbalized understanding instructions. No knowledge deficit noted. Prescribed medications ( including narcotic medication Percocet) given to the care taker. Belongings returned to patient and care taker. Patient assisted to wheel chair, wheeled to the hospital entrance door accompanied by volunteer service, and was discharged to home.

## 2017-11-23 NOTE — ANESTHESIA POSTPROCEDURE EVALUATION
Patient: Bill Worrell    Procedure(s):  LAPAROSCOPIC BILATERAL INGUINAL HERNIA REPAIR WITH MESH - Wound Class: I-Clean    Diagnosis:BILATERAL INGUINAL HERNIA  Diagnosis Additional Information: No value filed.    Anesthesia Type:  General, ETT    Note:  Anesthesia Post Evaluation    Patient location during evaluation: PACU  Patient participation: Able to fully participate in evaluation  Level of consciousness: awake  Pain management: adequate  Airway patency: patent  Cardiovascular status: acceptable  Respiratory status: acceptable  Hydration status: acceptable  PONV: none     Anesthetic complications: None          Last vitals:  Vitals:    11/22/17 1245 11/22/17 1300 11/22/17 1419   BP: 128/81 (!) 124/91 134/86   Pulse:      Resp: 18 12 14   Temp: 35.7  C (96.3  F) 35.7  C (96.3  F)    SpO2: 96% 98% 99%         Electronically Signed By: Dev Woodson MD  November 22, 2017  6:10 PM

## 2017-12-08 ENCOUNTER — OFFICE VISIT (OUTPATIENT)
Dept: SURGERY | Facility: CLINIC | Age: 47
End: 2017-12-08
Payer: COMMERCIAL

## 2017-12-08 DIAGNOSIS — Z09 SURGERY FOLLOW-UP: Primary | ICD-10-CM

## 2017-12-08 PROCEDURE — 99024 POSTOP FOLLOW-UP VISIT: CPT | Performed by: SURGERY

## 2017-12-08 NOTE — PROGRESS NOTES
Surgery Postoperative Note    S: Ramo returns for follow-up after laparoscopic bilateral inguinal hernia repair on November 22, 2017.  He reports he had pretty minimal pain following the surgery.  He used four of the Percocet.  He did have swelling in bilateral groins and this has improved.  He has had normal bowel movements and no nausea or vomiting.    Abdomen: incisions healing well, Steri-Strips removed, no evidence of infection, bilateral groin exam is negative for hernia.        A/P  Bill Worrell is recovering from a bilateral Laparoscopic Inguinal Hernia. I advised him to slowly return to regular activity. I expect he will make a complete recovery without issues.  He will follow-up as needed    Thank you for the opportunity to help in his care.    Kae Newberry  [facility]  [phone]    Please route or send letter to:  Primary Care Provider (PCP) and Referring Provider

## 2017-12-08 NOTE — MR AVS SNAPSHOT
After Visit Summary   12/8/2017    Bill Worrell    MRN: [id number]           Patient Information     Date Of Birth          1970        Visit Information        Provider Department      12/8/2017 4:00 PM Kae Newberry MD [facility] General Surgery      Today's Diagnoses     Surgery follow-up    -  1       Follow-ups after your visit        Who to contact     If you have questions or need follow up information about today's clinic visit or your schedule please contact [facility] directly at [phone].  Normal or non-critical lab and imaging results will be communicated to you by lensgenhart, letter or phone within 4 business days after the clinic has received the results. If you do not hear from us within 7 days, please contact the clinic through [email or web address] or phone. If you have a critical or abnormal lab result, we will notify you by phone as soon as possible.  Submit refill requests through Job36 or call your pharmacy and they will forward the refill request to us. Please allow 3 business days for your refill to be completed.          Additional Information About Your Visit        MyChart Information     [email or web address] gives you secure access to your electronic health record. If you see a primary care provider, you can also send messages to your care team and make appointments. If you have questions, please call your primary care clinic.  If you do not have a primary care provider, please call [phone] and they will assist you.        Care EveryWhere ID     This is your Care EveryWhere ID. This could be used by other organizations to access your [place] medical records  [id number]         Blood Pressure from Last 3 Encounters:   11/22/17 134/86   11/09/17 113/73   11/03/17 112/62    Weight from Last 3 Encounters:   11/22/17 181 lb 10.1 oz (82.4 kg)   11/09/17 182 lb 6.4 oz (82.7 kg)   11/03/17 182 lb (82.6 kg)               Today, you had the following     No orders found for display       Primary Care Provider Office Phone # Fax #    Arline Miller [phone] [address] [address] [facility] [address]        Equal Access to Services     KARIE FIGUEROA : Hadii marcella mcguire deono Soserafinali, waaxda luqadaha, qaybta kaalmada adekareen, carlos lowe laMarlenikaren earl. So [facility] [phone].    ATENCIÓN: Si habla español, tiene a eid disposición servicios gratuitos de asistencia lingüística. Llame al [phone].    We comply with applicable federal civil rights laws and [place] laws. We do not discriminate on the basis of race, color, national origin, age, disability, sex, sexual orientation, or gender identity.            Thank you!     Thank you for choosing [facility]  for your care. Our goal is always to provide you with excellent care. Hearing back from our patients is one way we can continue to improve our services. Please take a few minutes to complete the written survey that you may receive in the mail after your visit with us. Thank you!             Your Updated Medication List - Protect others around you: Learn how to safely use, store and throw away your medicines at www.disposemymeds.org.          This list is accurate as of: 12/8/17  4:21 PM.  Always use your most recent med list.                   Brand Name Dispense Instructions for use Diagnosis    levothyroxine 100 MCG tablet    SYNTHROID/LEVOTHROID    90 tablet    TAKE 1 TABLET (100 MCG) BY MOUTH DAILY    Hypothyroidism due to Hashimoto's thyroiditis       oxyCODONE-acetaminophen 5-325 MG per tablet    PERCOCET    30 tablet    Take 1-2 tablets by mouth every 4 hours as needed for pain (moderate to severe)    Non-recurrent bilateral inguinal hernia without obstruction or gangrene       senna-docusate 8.6-50 MG per tablet    SENOKOT-S;PERICOLACE    20 tablet    Take 1-2 tablets by mouth 2 times daily Take while on oral narcotics to  prevent or treat constipation.    Non-recurrent bilateral inguinal hernia without obstruction or gangrene       UNKNOWN TO PATIENT

## 2018-05-01 DIAGNOSIS — E06.3 HYPOTHYROIDISM DUE TO HASHIMOTO'S THYROIDITIS: ICD-10-CM

## 2018-05-01 RX ORDER — LEVOTHYROXINE SODIUM 100 UG/1
TABLET ORAL
Qty: 30 TABLET | Refills: 0 | Status: SHIPPED | OUTPATIENT
Start: 2018-05-01 | End: 2018-05-30

## 2018-05-01 NOTE — TELEPHONE ENCOUNTER
Medication is being filled for 1 time refill only due to:  Patient needs to be seen because needs yearly OV and labs.  mychart sent. Cristel Espitia RN, BSN

## 2018-05-01 NOTE — TELEPHONE ENCOUNTER
"Last Written Prescription Date:  5/09/17  Last Fill Quantity: 90 tablet,  # refills: 3   Last office visit: 11/9/2017 with prescribing provider:  Paul   Future Office Visit:      Requested Prescriptions   Pending Prescriptions Disp Refills     levothyroxine (SYNTHROID/LEVOTHROID) 100 MCG tablet [Pharmacy Med Name: LEVOTHYROXINE 100 MCG TABLET] 90 tablet 3     Sig: TAKE 1 TABLET (100 MCG) BY MOUTH DAILY    Thyroid Protocol Failed    5/1/2018  1:45 AM       Failed - Normal TSH on file in past 12 months    Recent Labs   Lab Test  04/03/17   0942   TSH  0.91             Passed - Patient is 12 years or older       Passed - Recent (12 mo) or future (30 days) visit within the authorizing provider's specialty    Patient had office visit in the last 12 months or has a visit in the next 30 days with authorizing provider or within the authorizing provider's specialty.  See \"Patient Info\" tab in inbasket, or \"Choose Columns\" in Meds & Orders section of the refill encounter.              "

## 2018-05-30 DIAGNOSIS — E06.3 HYPOTHYROIDISM DUE TO HASHIMOTO'S THYROIDITIS: ICD-10-CM

## 2018-05-30 NOTE — TELEPHONE ENCOUNTER
"Requested Prescriptions   Pending Prescriptions Disp Refills     levothyroxine (SYNTHROID/LEVOTHROID) 100 MCG tablet [Pharmacy Med Name: LEVOTHYROXINE 100 MCG TABLET]    Last Written Prescription Date: 5/1/18  Last Fill Quantity: 30 tablet,  # refills: 0   Last office visit: 11/9/2017 with prescribing provider:  Paul   Future Office Visit:     30 tablet 0     Sig: TAKE 1 TABLET (100 MCG) BY MOUTH DAILY **NEEDS APPOINTMENT FOR FUTURE REFILLS    Thyroid Protocol Failed    5/30/2018  1:51 AM       Failed - Normal TSH on file in past 12 months    Recent Labs   Lab Test  04/03/17   0942   TSH  0.91             Passed - Patient is 12 years or older       Passed - Recent (12 mo) or future (30 days) visit within the authorizing provider's specialty    Patient had office visit in the last 12 months or has a visit in the next 30 days with authorizing provider or within the authorizing provider's specialty.  See \"Patient Info\" tab in inbasket, or \"Choose Columns\" in Meds & Orders section of the refill encounter.              "

## 2018-05-30 NOTE — LETTER
June 1, 2018      Bill Worrell  [address]  [place] [address]        Dear Bill,     We sent a one week refill of levothyroxine to your pharmacy today. This is a reminder to schedule an office visit with your provider/doctor before the next refill. No further refills will be approved until your next appointment.     Your last annual thyroid lab test was 4/3/2017.    Sincerely,     PAULA Ortiz - Care Team of Arline Miller MD

## 2018-06-01 RX ORDER — LEVOTHYROXINE SODIUM 100 UG/1
TABLET ORAL
Qty: 7 TABLET | Refills: 0 | Status: SHIPPED | OUTPATIENT
Start: 2018-06-01 | End: 2018-06-14

## 2018-06-01 NOTE — TELEPHONE ENCOUNTER
Prescription approved per [facility] Refill Protocol. 7 days.  Letter mailed due for OV.  One mo refill sent 5/1/18 with Mychart reminder. No appointment scheduled.   Angel Faulkner RN

## 2018-06-13 ENCOUNTER — OFFICE VISIT (OUTPATIENT)
Dept: FAMILY MEDICINE | Facility: CLINIC | Age: 48
End: 2018-06-13
Payer: COMMERCIAL

## 2018-06-13 VITALS
OXYGEN SATURATION: 96 % | HEIGHT: 67 IN | BODY MASS INDEX: 28.25 KG/M2 | SYSTOLIC BLOOD PRESSURE: 131 MMHG | RESPIRATION RATE: 16 BRPM | DIASTOLIC BLOOD PRESSURE: 83 MMHG | HEART RATE: 61 BPM | WEIGHT: 180 LBS | TEMPERATURE: 98.1 F

## 2018-06-13 DIAGNOSIS — K40.20 BILATERAL INGUINAL HERNIA WITHOUT OBSTRUCTION OR GANGRENE, RECURRENCE NOT SPECIFIED: ICD-10-CM

## 2018-06-13 DIAGNOSIS — E06.3 HYPOTHYROIDISM DUE TO HASHIMOTO'S THYROIDITIS: ICD-10-CM

## 2018-06-13 DIAGNOSIS — Z13.220 LIPID SCREENING: ICD-10-CM

## 2018-06-13 DIAGNOSIS — Z12.11 COLON CANCER SCREENING: Primary | ICD-10-CM

## 2018-06-13 PROCEDURE — 36415 COLL VENOUS BLD VENIPUNCTURE: CPT | Performed by: FAMILY MEDICINE

## 2018-06-13 PROCEDURE — 84443 ASSAY THYROID STIM HORMONE: CPT | Performed by: FAMILY MEDICINE

## 2018-06-13 PROCEDURE — 99213 OFFICE O/P EST LOW 20 MIN: CPT | Performed by: FAMILY MEDICINE

## 2018-06-13 PROCEDURE — 80061 LIPID PANEL: CPT | Performed by: FAMILY MEDICINE

## 2018-06-13 RX ORDER — LEVOTHYROXINE SODIUM 100 UG/1
TABLET ORAL
Qty: 7 TABLET | Refills: 0 | Status: CANCELLED | OUTPATIENT
Start: 2018-06-13

## 2018-06-13 NOTE — MR AVS SNAPSHOT
After Visit Summary   6/13/2018    Bill Worrell    MRN: [id number]           Patient Information     Date Of Birth          1970        Visit Information        Provider Department      6/13/2018 7:30 AM Arline Miller MD [facility]        Today's Diagnoses     Colon cancer screening    -  1    Hypothyroidism due to Hashimoto's thyroiditis        Bilateral inguinal hernia without obstruction or gangrene, recurrence not specified        Lipid screening           Follow-ups after your visit        Follow-up notes from your care team     Return in about 1 year (around 6/13/2019).      Who to contact     If you have questions or need follow up information about today's clinic visit or your schedule please contact [facility] directly at [phone].  Normal or non-critical lab and imaging results will be communicated to you by Ruralco Holdingshart, letter or phone within 4 business days after the clinic has received the results. If you do not hear from us within 7 days, please contact the clinic through [employer] Holdingshart or phone. If you have a critical or abnormal lab result, we will notify you by phone as soon as possible.  Submit refill requests through ExTractApps or call your pharmacy and they will forward the refill request to us. Please allow 3 business days for your refill to be completed.          Additional Information About Your Visit        MyChart Information     ExTractApps gives you secure access to your electronic health record. If you see a primary care provider, you can also send messages to your care team and make appointments. If you have questions, please call your primary care clinic.  If you do not have a primary care provider, please call [phone] and they will assist you.        Care EveryWhere ID     This is your Care EveryWhere ID. This could be used by other organizations to access your [place] medical records  [id number]        Your Vitals Were     Pulse  "Temperature Respirations Height Pulse Oximetry BMI (Body Mass Index)    61 98.1  F (36.7  C) (Oral) 16 5' 6.5\" (1.689 m) 96% 28.62 kg/m2       Blood Pressure from Last 3 Encounters:   06/13/18 131/83   11/22/17 134/86   11/09/17 113/73    Weight from Last 3 Encounters:   06/13/18 180 lb (81.6 kg)   11/22/17 181 lb 10.1 oz (82.4 kg)   11/09/17 182 lb 6.4 oz (82.7 kg)              We Performed the Following     Lipid panel reflex to direct LDL Non-fasting     TSH WITH FREE T4 REFLEX        Primary Care Provider Office Phone # Fax #    Arline Miller [phone] [address] [facility] [address]        Equal Access to Services     [facility] HospitalNEIL : Hadii marcella zimmer Soankur, waaxda osmarqida, qaybta kaalbolivar castañeda, carlos jacome . So [facility] [phone].    ATENCIÓN: Si habla español, tiene a eid disposición servicios gratuitos de asistencia lingüística. Llame al [phone].    We comply with applicable federal civil rights laws and [place] laws. We do not discriminate on the basis of race, color, national origin, age, disability, sex, sexual orientation, or gender identity.            Thank you!     Thank you for choosing [facility]  for your care. Our goal is always to provide you with excellent care. Hearing back from our patients is one way we can continue to improve our services. Please take a few minutes to complete the written survey that you may receive in the mail after your visit with us. Thank you!             Your Updated Medication List - Protect others around you: Learn how to safely use, store and throw away your medicines at www.disposemymeds.org.          This list is accurate as of 6/13/18  7:54 AM.  Always use your most recent med list.                   Brand Name Dispense Instructions for use Diagnosis    levothyroxine 100 MCG tablet    SYNTHROID/LEVOTHROID    7 tablet    TAKE 1 TABLET (100 MCG) BY MOUTH DAILY **NEEDS " APPOINTMENT FOR FUTURE REFILLS    Hypothyroidism due to Hashimoto's thyroiditis

## 2018-06-13 NOTE — PROGRESS NOTES
SUBJECTIVE:   Bill Worrell is a 48 year old male who presents to clinic today for the following health issues:      Hypothyroidism Follow-up      Since last visit, patient describes the following symptoms: Weight stable, no hair loss, no skin changes, no constipation, no loose stools      Amount of exercise or physical activity: 1 day/week for an average of 30-45 minutes    Problems taking medications regularly: No    Medication side effects: none    Diet: regular (no restrictions)            Problem list and histories reviewed & adjusted, as indicated.  Additional history: as documented    Patient Active Problem List   Diagnosis     Mixed hyperlipidemia     Hyperlipidemia LDL goal <160     Hypothyroidism due to Hashimoto's thyroiditis     Intrinsic eczema     Bilateral inguinal hernia     Past Surgical History:   Procedure Laterality Date     LAPAROSCOPIC HERNIORRHAPHY INGUINAL BILATERAL Bilateral 11/22/2017    Procedure: LAPAROSCOPIC HERNIORRHAPHY INGUINAL BILATERAL;  LAPAROSCOPIC BILATERAL INGUINAL HERNIA REPAIR WITH MESH;  Surgeon: Kae Newberry MD;  Location: [facility]       Social History   Substance Use Topics     Smoking status: Former Smoker     Types: Cigars     Smokeless tobacco: Never Used      Comment: a cigar every 4 months     Alcohol use 0.0 oz/week     0 Standard drinks or equivalent per week      Comment: rarely     Family History   Problem Relation Age of Onset     HEART DISEASE Father      heart attack at age 55         Current Outpatient Prescriptions   Medication Sig Dispense Refill     levothyroxine (SYNTHROID/LEVOTHROID) 100 MCG tablet TAKE 1 TABLET (100 MCG) BY MOUTH DAILY **NEEDS APPOINTMENT FOR FUTURE REFILLS 7 tablet 0     Allergies   Allergen Reactions     Dogs      Penicillins        Reviewed and updated as needed this visit by clinical staff  Tobacco  Allergies  Meds  Med Hx  Surg Hx  Fam Hx  Soc Hx      Reviewed and updated as needed this visit by Provider      "    ROS:  CONSTITUTIONAL: NEGATIVE for fever, chills, change in weight    OBJECTIVE:     /83 (BP Location: Right arm, Patient Position: Chair, Cuff Size: Adult Large)  Pulse 61  Temp 98.1  F (36.7  C) (Oral)  Resp 16  Ht 5' 6.5\" (1.689 m)  Wt 180 lb (81.6 kg)  SpO2 96%  BMI 28.62 kg/m2  Body mass index is 28.62 kg/(m^2).  GENERAL: healthy, alert and no distress  NECK: no adenopathy, no asymmetry, masses, or scars and thyroid normal to palpation  RESP: lungs clear to auscultation - no rales, rhonchi or wheezes  CV: regular rate and rhythm, normal S1 S2, no S3 or S4, no murmur, click or rub, no peripheral edema and peripheral pulses strong  ABDOMEN: soft, nontender, no hepatosplenomegaly, no masses and bowel sounds normal        ASSESSMENT/PLAN:             1. Hypothyroidism due to Hashimoto's thyroiditis  Stable, will check   - TSH WITH FREE T4 REFLEX  And refill it accordingly.    2. Colon cancer screening  No need for screen at this time.    3. Bilateral inguinal hernia without obstruction or gangrene, recurrence not specified  S/p hernia.    4. Lipid screening    - Lipid panel reflex to direct LDL Non-fasting    Follow up in 1 year.    Arline Miller MD  [facility]"

## 2018-06-14 LAB
CHOLEST SERPL-MCNC: 197 MG/DL
HDLC SERPL-MCNC: 39 MG/DL
LDLC SERPL CALC-MCNC: 119 MG/DL
NONHDLC SERPL-MCNC: 158 MG/DL
TRIGL SERPL-MCNC: 197 MG/DL
TSH SERPL DL<=0.005 MIU/L-ACNC: 0.48 MU/L (ref 0.4–4)

## 2018-06-14 RX ORDER — LEVOTHYROXINE SODIUM 100 UG/1
TABLET ORAL
Qty: 90 TABLET | Refills: 3 | Status: SHIPPED | OUTPATIENT
Start: 2018-06-14 | End: 2019-06-06

## 2019-06-06 DIAGNOSIS — E06.3 HYPOTHYROIDISM DUE TO HASHIMOTO'S THYROIDITIS: ICD-10-CM

## 2019-06-06 NOTE — LETTER
June 7, 2019      Bill Worrell  [address]  [place] [address]        Dear Bill,     We recently received a call from your pharmacy requesting a refill of Levothyroxine.     A review of your chart indicates that an appointment is required with your provider for yearly visit, labs and med renewal. Please call the clinic at [phone] to schedule your appointment.     We have authorized one refill of your medication to allow time for you to schedule your appointment.      Taking care of your health is important to us and ongoing visits with your provider are vital to your care.  We look forward to seeing you in the near future.     Sincerely,        Arline Miller MD/Nat Lobo RN

## 2019-06-06 NOTE — TELEPHONE ENCOUNTER
"Last Written Prescription Date:  06/14/18  Last Fill Quantity: 90,  # refills: 3   Last office visit: 6/13/2018 with prescribing provider:  DR Miller   Future Office Visit:        Requested Prescriptions   Pending Prescriptions Disp Refills     levothyroxine (SYNTHROID/LEVOTHROID) 100 MCG tablet [Pharmacy Med Name: LEVOTHYROXINE 100 MCG TABLET] 90 tablet 3     Sig: TAKE 1 TABLET (100 MCG) BY MOUTH DAILY       Thyroid Protocol Passed - 6/6/2019 11:39 AM        Passed - Patient is 12 years or older        Passed - Recent (12 mo) or future (30 days) visit within the authorizing provider's specialty     Patient had office visit in the last 12 months or has a visit in the next 30 days with authorizing provider or within the authorizing provider's specialty.  See \"Patient Info\" tab in inbasket, or \"Choose Columns\" in Meds & Orders section of the refill encounter.              Passed - Medication is active on med list        Passed - Normal TSH on file in past 12 months     Recent Labs   Lab Test 06/13/18  0754   TSH 0.48                "

## 2019-06-07 RX ORDER — LEVOTHYROXINE SODIUM 100 UG/1
TABLET ORAL
Qty: 30 TABLET | Refills: 0 | Status: SHIPPED | OUTPATIENT
Start: 2019-06-07 | End: 2019-07-03

## 2019-07-02 ENCOUNTER — DOCUMENTATION ONLY (OUTPATIENT)
Dept: FAMILY MEDICINE | Facility: CLINIC | Age: 49
End: 2019-07-02

## 2019-07-03 ENCOUNTER — OFFICE VISIT (OUTPATIENT)
Dept: FAMILY MEDICINE | Facility: CLINIC | Age: 49
End: 2019-07-03
Payer: COMMERCIAL

## 2019-07-03 VITALS
WEIGHT: 187 LBS | HEART RATE: 54 BPM | SYSTOLIC BLOOD PRESSURE: 102 MMHG | TEMPERATURE: 97.8 F | RESPIRATION RATE: 14 BRPM | OXYGEN SATURATION: 98 % | BODY MASS INDEX: 29.73 KG/M2 | DIASTOLIC BLOOD PRESSURE: 70 MMHG

## 2019-07-03 DIAGNOSIS — E06.3 HYPOTHYROIDISM DUE TO HASHIMOTO'S THYROIDITIS: ICD-10-CM

## 2019-07-03 DIAGNOSIS — E78.5 HYPERLIPIDEMIA LDL GOAL <160: Primary | ICD-10-CM

## 2019-07-03 LAB
CHOLEST SERPL-MCNC: 238 MG/DL
GLUCOSE SERPL-MCNC: 98 MG/DL (ref 70–99)
HDLC SERPL-MCNC: 37 MG/DL
LDLC SERPL CALC-MCNC: 156 MG/DL
NONHDLC SERPL-MCNC: 201 MG/DL
TRIGL SERPL-MCNC: 223 MG/DL
TSH SERPL DL<=0.005 MIU/L-ACNC: 0.49 MU/L (ref 0.4–4)

## 2019-07-03 PROCEDURE — 80061 LIPID PANEL: CPT | Performed by: FAMILY MEDICINE

## 2019-07-03 PROCEDURE — 82947 ASSAY GLUCOSE BLOOD QUANT: CPT | Performed by: FAMILY MEDICINE

## 2019-07-03 PROCEDURE — 99213 OFFICE O/P EST LOW 20 MIN: CPT | Performed by: FAMILY MEDICINE

## 2019-07-03 PROCEDURE — 36415 COLL VENOUS BLD VENIPUNCTURE: CPT | Performed by: FAMILY MEDICINE

## 2019-07-03 PROCEDURE — 84443 ASSAY THYROID STIM HORMONE: CPT | Performed by: FAMILY MEDICINE

## 2019-07-03 RX ORDER — LEVOTHYROXINE SODIUM 100 UG/1
100 TABLET ORAL DAILY
Qty: 90 TABLET | Refills: 3 | Status: SHIPPED | OUTPATIENT
Start: 2019-07-03 | End: 2020-06-24

## 2019-07-03 NOTE — PROGRESS NOTES
Subjective     Bill Worrell is a 49 year old male who presents to clinic today for the following health issues:         Hypothyroidism:     Since last visit, patient describes the following symptoms::  None     Hypothyroidism Follow-up      Since last visit, patient describes the following symptoms: Weight stable, no hair loss, no skin changes, no constipation, no loose stools      Amount of exercise or physical activity: None    Problems taking medications regularly: No    Medication side effects: none    Diet: regular (no restrictions)          Patient Active Problem List   Diagnosis     Mixed hyperlipidemia     Hyperlipidemia LDL goal <160     Hypothyroidism due to Hashimoto's thyroiditis     Intrinsic eczema     Bilateral inguinal hernia     Past Surgical History:   Procedure Laterality Date     LAPAROSCOPIC HERNIORRHAPHY INGUINAL BILATERAL Bilateral 11/22/2017    Procedure: LAPAROSCOPIC HERNIORRHAPHY INGUINAL BILATERAL;  LAPAROSCOPIC BILATERAL INGUINAL HERNIA REPAIR WITH MESH;  Surgeon: Kae Newberry MD;  Location: [facility]       Social History     Tobacco Use     Smoking status: Former Smoker     Types: Cigars     Smokeless tobacco: Never Used     Tobacco comment: a cigar every 4 months   Substance Use Topics     Alcohol use: Yes     Alcohol/week: 0.0 oz     Comment: rarely     Family History   Problem Relation Age of Onset     Heart Disease Father         heart attack at age 55         Current Outpatient Medications   Medication Sig Dispense Refill     levothyroxine (SYNTHROID/LEVOTHROID) 100 MCG tablet TAKE 1 TABLET (100 MCG) BY MOUTH DAILY 30 tablet 0     Allergies   Allergen Reactions     Dogs      Penicillins        Reviewed and updated as needed this visit by Provider         Review of Systems         Objective    There were no vitals taken for this visit.  There is no height or weight on file to calculate BMI.  Physical Exam   GENERAL: healthy, alert and no distress  NECK: no adenopathy, no  asymmetry, masses, or scars and thyroid normal to palpation  RESP: lungs clear to auscultation - no rales, rhonchi or wheezes  CV: regular rate and rhythm, normal S1 S2, no S3 or S4, no murmur, click or rub, no peripheral edema and peripheral pulses strong            Assessment & Plan     1. Hyperlipidemia LDL goal <160  Check   - Lipid panel reflex to direct LDL Fasting  - Glucose    2. Hypothyroidism due to Hashimoto's thyroiditis  Stable,   - TSH  - levothyroxine (SYNTHROID/LEVOTHROID) 100 MCG tablet; Take 1 tablet (100 mcg) by mouth daily  Dispense: 90 tablet; Refill: 3       Today I counseled the patient about diet, regular exercise and weight loss planning.      Return in about 1 year (around 7/3/2020).    Arline Miller MD  [facility]

## 2019-10-03 ENCOUNTER — HEALTH MAINTENANCE LETTER (OUTPATIENT)
Age: 49
End: 2019-10-03

## 2020-06-24 DIAGNOSIS — E06.3 HYPOTHYROIDISM DUE TO HASHIMOTO'S THYROIDITIS: ICD-10-CM

## 2020-06-24 RX ORDER — LEVOTHYROXINE SODIUM 100 UG/1
TABLET ORAL
Qty: 90 TABLET | Refills: 0 | Status: SHIPPED | OUTPATIENT
Start: 2020-06-24 | End: 2020-07-13

## 2020-07-13 ENCOUNTER — OFFICE VISIT (OUTPATIENT)
Dept: FAMILY MEDICINE | Facility: CLINIC | Age: 50
End: 2020-07-13
Payer: COMMERCIAL

## 2020-07-13 VITALS
BODY MASS INDEX: 30.14 KG/M2 | HEART RATE: 69 BPM | WEIGHT: 189.6 LBS | SYSTOLIC BLOOD PRESSURE: 111 MMHG | OXYGEN SATURATION: 95 % | RESPIRATION RATE: 16 BRPM | DIASTOLIC BLOOD PRESSURE: 75 MMHG | TEMPERATURE: 98.5 F

## 2020-07-13 DIAGNOSIS — Z12.11 SCREEN FOR COLON CANCER: Primary | ICD-10-CM

## 2020-07-13 DIAGNOSIS — E06.3 HYPOTHYROIDISM DUE TO HASHIMOTO'S THYROIDITIS: ICD-10-CM

## 2020-07-13 DIAGNOSIS — Z23 NEED FOR TDAP VACCINATION: ICD-10-CM

## 2020-07-13 PROCEDURE — 82947 ASSAY GLUCOSE BLOOD QUANT: CPT | Performed by: FAMILY MEDICINE

## 2020-07-13 PROCEDURE — 90471 IMMUNIZATION ADMIN: CPT | Performed by: FAMILY MEDICINE

## 2020-07-13 PROCEDURE — 84443 ASSAY THYROID STIM HORMONE: CPT | Performed by: FAMILY MEDICINE

## 2020-07-13 PROCEDURE — 99213 OFFICE O/P EST LOW 20 MIN: CPT | Mod: 25 | Performed by: FAMILY MEDICINE

## 2020-07-13 PROCEDURE — 90715 TDAP VACCINE 7 YRS/> IM: CPT | Performed by: FAMILY MEDICINE

## 2020-07-13 PROCEDURE — 36415 COLL VENOUS BLD VENIPUNCTURE: CPT | Performed by: FAMILY MEDICINE

## 2020-07-13 RX ORDER — LEVOTHYROXINE SODIUM 100 UG/1
100 TABLET ORAL DAILY
Qty: 90 TABLET | Refills: 3 | Status: SHIPPED | OUTPATIENT
Start: 2020-07-13 | End: 2020-09-08

## 2020-07-13 NOTE — PROGRESS NOTES
Subjective     Bill Worrell is a 50 year old male who presents to clinic today for the following health issues:    History of Present Illness        He eats 0-1 servings of fruits and vegetables daily.He consumes 1 sweetened beverage(s) daily.He exercises with enough effort to increase his heart rate 20 to 29 minutes per day.  He exercises with enough effort to increase his heart rate 5 days per week.   He is taking medications regularly.         Hypothyroidism Follow-up      Since last visit, patient describes the following symptoms: weight gain of 10 lbs          Patient Active Problem List   Diagnosis     Mixed hyperlipidemia     Hyperlipidemia LDL goal <160     Hypothyroidism due to Hashimoto's thyroiditis     Intrinsic eczema     Bilateral inguinal hernia     Past Surgical History:   Procedure Laterality Date     LAPAROSCOPIC HERNIORRHAPHY INGUINAL BILATERAL Bilateral 11/22/2017    Procedure: LAPAROSCOPIC HERNIORRHAPHY INGUINAL BILATERAL;  LAPAROSCOPIC BILATERAL INGUINAL HERNIA REPAIR WITH MESH;  Surgeon: Kae Newberry MD;  Location: [facility]       Social History     Tobacco Use     Smoking status: Former Smoker     Types: Cigars     Smokeless tobacco: Never Used     Tobacco comment: a cigar every 4 months   Substance Use Topics     Alcohol use: Yes     Alcohol/week: 0.0 standard drinks     Comment: rarely     Family History   Problem Relation Age of Onset     Heart Disease Father         heart attack at age 55         Current Outpatient Medications   Medication Sig Dispense Refill     levothyroxine (SYNTHROID/LEVOTHROID) 100 MCG tablet Take 1 tablet (100 mcg) by mouth daily 90 tablet 3       Reviewed and updated as needed this visit by Provider         Review of Systems   CONSTITUTIONAL: NEGATIVE for fever, chills, change in weight  CV: NEGATIVE for chest pain, palpitations or peripheral edema      Objective    /75 (BP Location: Right arm, Patient Position: Sitting, Cuff Size: Adult Large)    Pulse 69   Temp 98.5  F (36.9  C) (Oral)   Resp 16   Wt 86 kg (189 lb 9.6 oz)   SpO2 95%   BMI 30.14 kg/m    Body mass index is 30.14 kg/m .  Physical Exam   GENERAL: healthy, alert and no distress  NECK: no adenopathy, no asymmetry, masses, or scars and thyroid normal to palpation  RESP: lungs clear to auscultation - no rales, rhonchi or wheezes  CV: regular rate and rhythm, normal S1 S2, no S3 or S4, no murmur, click or rub, no peripheral edema and peripheral pulses strong  MS: no gross musculoskeletal defects noted, no edema            Assessment & Plan     1. Screen for colon cancer  Had colonoscopy 2019, showed 1 polyp, repeat in 5 years.    2. Need for Tdap vaccination  given  - VACCINE ADMINISTRATION, INITIAL  - TDAP VACCINE (ADACEL)    3. Hypothyroidism due to Hashimoto's thyroiditis  Controlled, check labs and follow up with the results.  - TSH  - Glucose  - levothyroxine (SYNTHROID/LEVOTHROID) 100 MCG tablet; Take 1 tablet (100 mcg) by mouth daily  Dispense: 90 tablet; Refill: 3           Follow up in 1 year.  Arline Miller MD  [facility]

## 2020-07-14 ENCOUNTER — TELEPHONE (OUTPATIENT)
Dept: FAMILY MEDICINE | Facility: CLINIC | Age: 50
End: 2020-07-14

## 2020-07-14 DIAGNOSIS — E06.3 HYPOTHYROIDISM DUE TO HASHIMOTO'S THYROIDITIS: Primary | ICD-10-CM

## 2020-07-14 LAB
GLUCOSE SERPL-MCNC: 99 MG/DL (ref 70–99)
TSH SERPL DL<=0.005 MIU/L-ACNC: 0.03 MU/L (ref 0.4–4)

## 2020-07-14 NOTE — TELEPHONE ENCOUNTER
Thyroid is on the higher side, but I recommend that we continue on same dose, and recheck labs in 1 month (lab only)  Ordered.  Arline Miller MD  [facility]  [phone]

## 2020-09-04 DIAGNOSIS — E06.3 HYPOTHYROIDISM DUE TO HASHIMOTO'S THYROIDITIS: ICD-10-CM

## 2020-09-04 PROCEDURE — 36415 COLL VENOUS BLD VENIPUNCTURE: CPT | Performed by: FAMILY MEDICINE

## 2020-09-04 PROCEDURE — 84443 ASSAY THYROID STIM HORMONE: CPT | Performed by: FAMILY MEDICINE

## 2020-09-04 PROCEDURE — 84439 ASSAY OF FREE THYROXINE: CPT | Performed by: FAMILY MEDICINE

## 2020-09-05 LAB
T4 FREE SERPL-MCNC: 1.31 NG/DL (ref 0.76–1.46)
TSH SERPL DL<=0.005 MIU/L-ACNC: 0.03 MU/L (ref 0.4–4)

## 2020-09-08 ENCOUNTER — TELEPHONE (OUTPATIENT)
Dept: FAMILY MEDICINE | Facility: CLINIC | Age: 50
End: 2020-09-08

## 2020-09-08 DIAGNOSIS — E06.3 HYPOTHYROIDISM DUE TO HASHIMOTO'S THYROIDITIS: ICD-10-CM

## 2020-09-08 RX ORDER — LEVOTHYROXINE SODIUM 88 UG/1
88 TABLET ORAL DAILY
Qty: 90 TABLET | Refills: 1 | Status: SHIPPED | OUTPATIENT
Start: 2020-09-08 | End: 2021-03-01

## 2020-09-08 NOTE — TELEPHONE ENCOUNTER
Please call Ramo, his thyroid function is still slightly high, I recommend that we drop the dose down to 88 mcg daily, recheck labs in 3 months (please schedule for that )  Arline Miller MD  [facility]  [phone]

## 2020-12-03 DIAGNOSIS — E06.3 HYPOTHYROIDISM DUE TO HASHIMOTO'S THYROIDITIS: ICD-10-CM

## 2020-12-03 PROCEDURE — 84443 ASSAY THYROID STIM HORMONE: CPT | Performed by: FAMILY MEDICINE

## 2020-12-03 PROCEDURE — 36415 COLL VENOUS BLD VENIPUNCTURE: CPT | Performed by: FAMILY MEDICINE

## 2020-12-04 LAB — TSH SERPL DL<=0.005 MIU/L-ACNC: 0.8 MU/L (ref 0.4–4)

## 2021-01-15 ENCOUNTER — HEALTH MAINTENANCE LETTER (OUTPATIENT)
Age: 51
End: 2021-01-15

## 2021-08-31 DIAGNOSIS — E06.3 HYPOTHYROIDISM DUE TO HASHIMOTO'S THYROIDITIS: ICD-10-CM

## 2021-09-02 RX ORDER — LEVOTHYROXINE SODIUM 88 UG/1
TABLET ORAL
Qty: 30 TABLET | Refills: 0 | Status: SHIPPED | OUTPATIENT
Start: 2021-09-02 | End: 2021-10-05

## 2021-09-02 NOTE — TELEPHONE ENCOUNTER
Please call, pt needs to be seen.  Arline Miller MD  [facility]  [phone]

## 2021-09-02 NOTE — TELEPHONE ENCOUNTER
Routing refill request to provider for review/approval because:  Patsy given x1 and patient did not follow up, please advise  Patient needs to be seen because it has been more than 1 year since last office visit.    Eric MENG RN

## 2021-09-05 ENCOUNTER — HEALTH MAINTENANCE LETTER (OUTPATIENT)
Age: 51
End: 2021-09-05

## 2021-10-03 DIAGNOSIS — E06.3 HYPOTHYROIDISM DUE TO HASHIMOTO'S THYROIDITIS: ICD-10-CM

## 2021-10-05 RX ORDER — LEVOTHYROXINE SODIUM 88 UG/1
TABLET ORAL
Qty: 30 TABLET | Refills: 0 | Status: SHIPPED | OUTPATIENT
Start: 2021-10-05 | End: 2021-11-09

## 2021-11-05 ENCOUNTER — VIRTUAL VISIT (OUTPATIENT)
Dept: FAMILY MEDICINE | Facility: CLINIC | Age: 51
End: 2021-11-05
Payer: COMMERCIAL

## 2021-11-05 DIAGNOSIS — E78.5 HYPERLIPIDEMIA LDL GOAL <160: ICD-10-CM

## 2021-11-05 DIAGNOSIS — E06.3 HYPOTHYROIDISM DUE TO HASHIMOTO'S THYROIDITIS: Primary | ICD-10-CM

## 2021-11-05 PROBLEM — D12.6 TUBULAR ADENOMA OF COLON: Status: ACTIVE | Noted: 2019-07-19

## 2021-11-05 PROCEDURE — 99214 OFFICE O/P EST MOD 30 MIN: CPT | Mod: TEL | Performed by: FAMILY MEDICINE

## 2021-11-05 NOTE — PROGRESS NOTES
KEREN is a 51 year old who is being evaluated via a billable telephone visit.      What phone number would you like to be contacted at? [phone]  How would you like to obtain your AVS? MyChart    Assessment & Plan     Hypothyroidism due to Hashimoto's thyroiditis  Check TSH, and refill medicine accordingly.  - **TSH with free T4 reflex FUTURE 2mo; Future    Hyperlipidemia LDL goal <160    - Lipid panel reflex to direct LDL Fasting; Future    Updated his colonoscopy records today, to patient's knowledge they recommended repeating colonoscopy in 5 years and no sooner than that.             Return in about 1 year (around 11/5/2022) for Routine physical..    Arline Miller MD  [facility]   KEREN is a 51 year old who presents for the following health issues     HPI     Hypothyroidism Follow-up      Since last visit, patient describes the following symptoms: Weight stable, no hair loss, no skin changes, no constipation, no loose stools      How many servings of fruits and vegetables do you eat daily?  0-1    On average, how many sweetened beverages do you drink each day (Examples: soda, juice, sweet tea, etc.  Do NOT count diet or artificially sweetened beverages)?   1    How many days per week do you exercise enough to make your heart beat faster? 5    How many minutes a day do you exercise enough to make your heart beat faster? 60 or more    How many days per week do you miss taking your medication? 0        Review of Systems         Objective           Vitals:  No vitals were obtained today due to virtual visit.    Physical Exam   healthy, alert and no distress  PSYCH: Alert and oriented times 3; coherent speech, normal   rate and volume, able to articulate logical thoughts, able   to abstract reason, no tangential thoughts, no hallucinations   or delusions  His affect is normal  RESP: No cough, no audible wheezing, able to talk in full sentences  Remainder of exam unable to be  completed due to telephone visits                Phone call duration: 12 minutes

## 2021-11-07 DIAGNOSIS — E06.3 HYPOTHYROIDISM DUE TO HASHIMOTO'S THYROIDITIS: ICD-10-CM

## 2021-11-09 RX ORDER — LEVOTHYROXINE SODIUM 88 UG/1
TABLET ORAL
Qty: 90 TABLET | Refills: 0 | Status: SHIPPED | OUTPATIENT
Start: 2021-11-09 | End: 2022-02-04

## 2021-11-09 NOTE — TELEPHONE ENCOUNTER
Prescription approved per [facility] Refill Protocol.    Leidy Teran RN on 11/9/2021 at 9:34 AM

## 2021-11-12 ENCOUNTER — LAB (OUTPATIENT)
Dept: LAB | Facility: CLINIC | Age: 51
End: 2021-11-12
Payer: COMMERCIAL

## 2021-11-12 ENCOUNTER — ALLIED HEALTH/NURSE VISIT (OUTPATIENT)
Dept: FAMILY MEDICINE | Facility: CLINIC | Age: 51
End: 2021-11-12
Payer: COMMERCIAL

## 2021-11-12 DIAGNOSIS — Z23 NEED FOR PROPHYLACTIC VACCINATION AND INOCULATION AGAINST INFLUENZA: Primary | ICD-10-CM

## 2021-11-12 DIAGNOSIS — E78.5 HYPERLIPIDEMIA LDL GOAL <160: ICD-10-CM

## 2021-11-12 DIAGNOSIS — E06.3 HYPOTHYROIDISM DUE TO HASHIMOTO'S THYROIDITIS: ICD-10-CM

## 2021-11-12 LAB
CHOLEST SERPL-MCNC: 211 MG/DL
FASTING STATUS PATIENT QL REPORTED: YES
HDLC SERPL-MCNC: 38 MG/DL
LDLC SERPL CALC-MCNC: 147 MG/DL
NONHDLC SERPL-MCNC: 173 MG/DL
TRIGL SERPL-MCNC: 130 MG/DL
TSH SERPL DL<=0.005 MIU/L-ACNC: 0.74 MU/L (ref 0.4–4)

## 2021-11-12 PROCEDURE — 84443 ASSAY THYROID STIM HORMONE: CPT

## 2021-11-12 PROCEDURE — 36415 COLL VENOUS BLD VENIPUNCTURE: CPT

## 2021-11-12 PROCEDURE — 90471 IMMUNIZATION ADMIN: CPT

## 2021-11-12 PROCEDURE — 99207 PR NO CHARGE NURSE ONLY: CPT

## 2021-11-12 PROCEDURE — 91300 PR COVID VAC PFIZER DIL RECON 30 MCG/0.3 ML IM: CPT

## 2021-11-12 PROCEDURE — 0004A PR COVID VAC PFIZER DIL RECON 30 MCG/0.3 ML IM: CPT

## 2021-11-12 PROCEDURE — 90682 RIV4 VACC RECOMBINANT DNA IM: CPT

## 2021-11-12 PROCEDURE — 80061 LIPID PANEL: CPT

## 2022-02-04 DIAGNOSIS — E06.3 HYPOTHYROIDISM DUE TO HASHIMOTO'S THYROIDITIS: ICD-10-CM

## 2022-02-04 RX ORDER — LEVOTHYROXINE SODIUM 88 UG/1
TABLET ORAL
Qty: 90 TABLET | Refills: 2 | Status: SHIPPED | OUTPATIENT
Start: 2022-02-04 | End: 2022-11-07

## 2022-02-20 ENCOUNTER — HEALTH MAINTENANCE LETTER (OUTPATIENT)
Age: 52
End: 2022-02-20

## 2022-10-23 ENCOUNTER — HEALTH MAINTENANCE LETTER (OUTPATIENT)
Age: 52
End: 2022-10-23

## 2022-11-05 DIAGNOSIS — E06.3 HYPOTHYROIDISM DUE TO HASHIMOTO'S THYROIDITIS: ICD-10-CM

## 2022-11-07 RX ORDER — LEVOTHYROXINE SODIUM 88 UG/1
TABLET ORAL
Qty: 90 TABLET | Refills: 0 | Status: SHIPPED | OUTPATIENT
Start: 2022-11-07 | End: 2023-01-16

## 2022-11-07 NOTE — TELEPHONE ENCOUNTER
MA/ to assist with updating needed screenings and/or scheduling follow up appointment.     Prescription approved per [facility] Refill Protocol.   SAUMYA refill. Needs appointment.  Olivia Murphy RN

## 2022-11-07 NOTE — TELEPHONE ENCOUNTER
Called pt scheduled visit with Dr. Miller on 01/16/2023.    Taryn Díaz MA on 11/7/2022 at 1:45 PM

## 2023-01-16 ENCOUNTER — OFFICE VISIT (OUTPATIENT)
Dept: FAMILY MEDICINE | Facility: CLINIC | Age: 53
End: 2023-01-16
Payer: COMMERCIAL

## 2023-01-16 VITALS — HEIGHT: 66 IN | BODY MASS INDEX: 30.98 KG/M2

## 2023-01-16 DIAGNOSIS — Z00.00 ROUTINE GENERAL MEDICAL EXAMINATION AT A HEALTH CARE FACILITY: Primary | ICD-10-CM

## 2023-01-16 DIAGNOSIS — E06.3 HYPOTHYROIDISM DUE TO HASHIMOTO'S THYROIDITIS: ICD-10-CM

## 2023-01-16 DIAGNOSIS — D12.6 TUBULAR ADENOMA OF COLON: ICD-10-CM

## 2023-01-16 LAB
FASTING STATUS PATIENT QL REPORTED: NORMAL
GLUCOSE SERPL-MCNC: 98 MG/DL (ref 70–99)
TSH SERPL DL<=0.005 MIU/L-ACNC: 3.48 UIU/ML (ref 0.3–4.2)

## 2023-01-16 PROCEDURE — 84443 ASSAY THYROID STIM HORMONE: CPT | Performed by: FAMILY MEDICINE

## 2023-01-16 PROCEDURE — 82947 ASSAY GLUCOSE BLOOD QUANT: CPT | Performed by: FAMILY MEDICINE

## 2023-01-16 PROCEDURE — 99396 PREV VISIT EST AGE 40-64: CPT | Performed by: FAMILY MEDICINE

## 2023-01-16 PROCEDURE — 36415 COLL VENOUS BLD VENIPUNCTURE: CPT | Performed by: FAMILY MEDICINE

## 2023-01-16 RX ORDER — LEVOTHYROXINE SODIUM 88 UG/1
88 TABLET ORAL DAILY
Qty: 90 TABLET | Refills: 3 | Status: SHIPPED | OUTPATIENT
Start: 2023-01-16 | End: 2024-02-09

## 2023-01-16 SDOH — HEALTH STABILITY: PHYSICAL HEALTH: ON AVERAGE, HOW MANY DAYS PER WEEK DO YOU ENGAGE IN MODERATE TO STRENUOUS EXERCISE (LIKE A BRISK WALK)?: 1 DAY

## 2023-01-16 SDOH — ECONOMIC STABILITY: TRANSPORTATION INSECURITY
IN THE PAST 12 MONTHS, HAS THE LACK OF TRANSPORTATION KEPT YOU FROM MEDICAL APPOINTMENTS OR FROM GETTING MEDICATIONS?: NO

## 2023-01-16 SDOH — HEALTH STABILITY: PHYSICAL HEALTH: ON AVERAGE, HOW MANY MINUTES DO YOU ENGAGE IN EXERCISE AT THIS LEVEL?: 40 MIN

## 2023-01-16 SDOH — ECONOMIC STABILITY: INCOME INSECURITY: IN THE LAST 12 MONTHS, WAS THERE A TIME WHEN YOU WERE NOT ABLE TO PAY THE MORTGAGE OR RENT ON TIME?: NO

## 2023-01-16 SDOH — ECONOMIC STABILITY: FOOD INSECURITY: WITHIN THE PAST 12 MONTHS, THE FOOD YOU BOUGHT JUST DIDN'T LAST AND YOU DIDN'T HAVE MONEY TO GET MORE.: NEVER TRUE

## 2023-01-16 SDOH — ECONOMIC STABILITY: INCOME INSECURITY: HOW HARD IS IT FOR YOU TO PAY FOR THE VERY BASICS LIKE FOOD, HOUSING, MEDICAL CARE, AND HEATING?: NOT HARD AT ALL

## 2023-01-16 SDOH — ECONOMIC STABILITY: TRANSPORTATION INSECURITY
IN THE PAST 12 MONTHS, HAS LACK OF TRANSPORTATION KEPT YOU FROM MEETINGS, WORK, OR FROM GETTING THINGS NEEDED FOR DAILY LIVING?: NO

## 2023-01-16 SDOH — ECONOMIC STABILITY: FOOD INSECURITY: WITHIN THE PAST 12 MONTHS, YOU WORRIED THAT YOUR FOOD WOULD RUN OUT BEFORE YOU GOT MONEY TO BUY MORE.: NEVER TRUE

## 2023-01-16 ASSESSMENT — ENCOUNTER SYMPTOMS
FREQUENCY: 0
WEAKNESS: 0
MYALGIAS: 0
HEARTBURN: 0
NERVOUS/ANXIOUS: 0
HEMATOCHEZIA: 0
EYE PAIN: 0
SORE THROAT: 0
DIARRHEA: 0
HEMATURIA: 0
ABDOMINAL PAIN: 0
CONSTIPATION: 0
ARTHRALGIAS: 0
COUGH: 0
SHORTNESS OF BREATH: 0
PALPITATIONS: 0
JOINT SWELLING: 0
DYSURIA: 0
FEVER: 0
NAUSEA: 0
PARESTHESIAS: 0
DIZZINESS: 0
CHILLS: 0
HEADACHES: 0

## 2023-01-16 ASSESSMENT — SOCIAL DETERMINANTS OF HEALTH (SDOH)
IN A TYPICAL WEEK, HOW MANY TIMES DO YOU TALK ON THE PHONE WITH FAMILY, FRIENDS, OR NEIGHBORS?: MORE THAN THREE TIMES A WEEK
HOW OFTEN DO YOU GET TOGETHER WITH FRIENDS OR RELATIVES?: ONCE A WEEK
HOW OFTEN DO YOU ATTEND CHURCH OR RELIGIOUS SERVICES?: MORE THAN 4 TIMES PER YEAR
DO YOU BELONG TO ANY CLUBS OR ORGANIZATIONS SUCH AS CHURCH GROUPS UNIONS, FRATERNAL OR ATHLETIC GROUPS, OR SCHOOL GROUPS?: NO

## 2023-01-16 ASSESSMENT — LIFESTYLE VARIABLES
SKIP TO QUESTIONS 9-10: 1
HOW MANY STANDARD DRINKS CONTAINING ALCOHOL DO YOU HAVE ON A TYPICAL DAY: 1 OR 2
AUDIT-C TOTAL SCORE: 1
HOW OFTEN DO YOU HAVE SIX OR MORE DRINKS ON ONE OCCASION: NEVER
HOW OFTEN DO YOU HAVE A DRINK CONTAINING ALCOHOL: MONTHLY OR LESS

## 2023-01-16 NOTE — PROGRESS NOTES
SUBJECTIVE:   CC: KEREN is an 52 year old who presents for preventative health visit.   Patient has been advised of split billing requirements and indicates understanding: Yes     Healthy Habits:     Getting at least 3 servings of Calcium per day:  Yes    Bi-annual eye exam:  NO    Dental care twice a year:  Yes    Sleep apnea or symptoms of sleep apnea:  None    Diet:  Regular (no restrictions)    Frequency of exercise:  1 day/week    Duration of exercise:  15-30 minutes    Taking medications regularly:  Yes    Medication side effects:  Not applicable    PHQ-2 Total Score: 0    Additional concerns today:  No    Hypothyroidism Follow-up      Since last visit, patient describes the following symptoms: Weight stable, no hair loss, no skin changes, no constipation, no loose stools    Today's PHQ-2 Score:   PHQ-2 ( 1999 Pfizer) 1/16/2023   Q1: Little interest or pleasure in doing things 0   Q2: Feeling down, depressed or hopeless 0   PHQ-2 Score 0   PHQ-2 Total Score (12-17 Years)- Positive if 3 or more points; Administer PHQ-A if positive -   Q1: Little interest or pleasure in doing things Not at all   Q2: Feeling down, depressed or hopeless Not at all   PHQ-2 Score 0     Social History     Tobacco Use     Smoking status: Former     Types: Cigars     Smokeless tobacco: Never     Tobacco comments:     a cigar every 4 months   Substance Use Topics     Alcohol use: Yes     Alcohol/week: 0.0 standard drinks     Comment: rarely     Alcohol Use 1/16/2023   Prescreen: >3 drinks/day or >7 drinks/week? No   Prescreen: >3 drinks/day or >7 drinks/week? -     Last PSA: No results found for: PSA    Reviewed orders with patient. Reviewed health maintenance and updated orders accordingly - Yes  Patient Active Problem List   Diagnosis     Mixed hyperlipidemia     Hyperlipidemia LDL goal <160     Hypothyroidism due to Hashimoto's thyroiditis     Intrinsic eczema     Bilateral inguinal hernia     Tubular adenoma of colon     Past  Surgical History:   Procedure Laterality Date     LAPAROSCOPIC HERNIORRHAPHY INGUINAL BILATERAL Bilateral 2017    Procedure: LAPAROSCOPIC HERNIORRHAPHY INGUINAL BILATERAL;  LAPAROSCOPIC BILATERAL INGUINAL HERNIA REPAIR WITH MESH;  Surgeon: Kae Newberry MD;  Location: [facility]       Social History     Tobacco Use     Smoking status: Former     Types: Cigars     Quit date:      Years since quittin.0     Smokeless tobacco: Never     Tobacco comments:     a cigar every 4 months   Substance Use Topics     Alcohol use: Yes     Alcohol/week: 0.0 standard drinks     Comment: rarely     Family History   Problem Relation Age of Onset     Heart Disease Father         heart attack at age 55         Current Outpatient Medications   Medication Sig Dispense Refill     levothyroxine (SYNTHROID/LEVOTHROID) 88 MCG tablet TAKE 1 TABLET BY MOUTH EVERY DAY 90 tablet 0     Allergies   Allergen Reactions     Dogs      Penicillins        Reviewed and updated as needed this visit by clinical staff   Tobacco  Allergies  Meds            Reviewed and updated as needed this visit by Provider                 Past Medical History:   Diagnosis Date     Bilateral inguinal hernia 2018    S/p repair.     Hypothyroidism 2010     Hypothyroidism due to Hashimoto's thyroiditis 7/10/2017     Intrinsic eczema 7/10/2017     Mixed hyperlipidemia 10/19/2006     Psoriasis 2010      Past Surgical History:   Procedure Laterality Date     LAPAROSCOPIC HERNIORRHAPHY INGUINAL BILATERAL Bilateral 2017    Procedure: LAPAROSCOPIC HERNIORRHAPHY INGUINAL BILATERAL;  LAPAROSCOPIC BILATERAL INGUINAL HERNIA REPAIR WITH MESH;  Surgeon: Kae Newberry MD;  Location: [facility]       Review of Systems   Constitutional: Negative for chills and fever.   HENT: Negative for congestion, ear pain, hearing loss and sore throat.    Eyes: Negative for pain and visual disturbance.   Respiratory: Negative for cough and shortness of breath.   "  Cardiovascular: Negative for chest pain, palpitations and peripheral edema.   Gastrointestinal: Negative for abdominal pain, constipation, diarrhea, heartburn, hematochezia and nausea.   Genitourinary: Negative for dysuria, frequency, genital sores, hematuria, impotence, penile discharge and urgency.   Musculoskeletal: Negative for arthralgias, joint swelling and myalgias.   Skin: Negative for rash.   Neurological: Negative for dizziness, weakness, headaches and paresthesias.   Psychiatric/Behavioral: Negative for mood changes. The patient is not nervous/anxious.          OBJECTIVE:   Ht 1.666 m (5' 5.6\")   BMI 30.98 kg/m      Physical Exam  GENERAL: healthy, alert and no distress  EYES: Eyes grossly normal to inspection, PERRL and conjunctivae and sclerae normal  HENT: ear canals and TM's normal, nose and mouth without ulcers or lesions  NECK: no adenopathy, no asymmetry, masses, or scars and thyroid normal to palpation  RESP: lungs clear to auscultation - no rales, rhonchi or wheezes  CV: regular rate and rhythm, normal S1 S2, no S3 or S4, no murmur, click or rub, no peripheral edema and peripheral pulses strong  ABDOMEN: soft, nontender, no hepatosplenomegaly, no masses and bowel sounds normal  MS: no gross musculoskeletal defects noted, no edema  SKIN:   NEURO: Normal strength and tone, mentation intact and speech normal  PSYCH: mentation appears normal, affect normal/bright        ASSESSMENT/PLAN:   (Z00.00) Routine general medical examination at a health care facility  (primary encounter diagnosis)  Comment: Today I counseled the patient about diet, regular exercise and weight loss planning.  2 lesions to watch on the face, call in 1 month if no improvement.   Plan: Glucose            (E03.8,  E06.3) Hypothyroidism due to Hashimoto's thyroiditis  Comment: check TSH and refill levothyroxine accordingly.  Plan: levothyroxine (SYNTHROID/LEVOTHROID) 88 MCG         tablet, TSH with free T4 reflex        "     (D12.6) Tubular adenoma of colon  Comment: to repeat colonoscopy in 1 year.   Plan: as above.         COUNSELING:   Reviewed preventive health counseling, as reflected in patient instructions       Regular exercise       Healthy diet/nutrition    He reports that he has quit smoking. His smoking use included cigars. He has never used smokeless tobacco.      Arline Miller MD  [facility]

## 2023-07-07 NOTE — OR NURSING
Assumed cares while primary nurse went on break from 1245 to 1315.   PDMP Monitoring:    Last PDMP Chito Courtney as Reviewed [facility]):  Review User Review Instant Review Result   Ernie Boris 5/11/2023  7:27 AM Reviewed PDMP [1]     Urine Drug Screenings (1 yr)     POCT Rapid Drug Screen  Collected: 1/13/2023  9:55 AM (Final result)          POCT Rapid Drug Screen  Collected: 12/21/2021 (Final result)              Medication Contract and Consent for Opioid Use Documents Filed     Patient Documents     Type of Document Status Date Received Received By Description    Medication Contract Signed 10/8/2018  3:27 PM Srinivasan Correa

## 2023-08-03 ENCOUNTER — HOSPITAL ENCOUNTER (OUTPATIENT)
Dept: ULTRASOUND IMAGING | Facility: CLINIC | Age: 53
Discharge: HOME | End: 2023-08-03
Attending: PHYSICIAN ASSISTANT | Admitting: PHYSICIAN ASSISTANT
Payer: COMMERCIAL

## 2023-08-03 ENCOUNTER — OFFICE VISIT (OUTPATIENT)
Dept: FAMILY MEDICINE | Facility: CLINIC | Age: 53
End: 2023-08-03
Payer: COMMERCIAL

## 2023-08-03 VITALS
RESPIRATION RATE: 16 BRPM | HEART RATE: 58 BPM | HEIGHT: 65 IN | BODY MASS INDEX: 31.99 KG/M2 | DIASTOLIC BLOOD PRESSURE: 74 MMHG | OXYGEN SATURATION: 97 % | WEIGHT: 192 LBS | SYSTOLIC BLOOD PRESSURE: 115 MMHG | TEMPERATURE: 97.7 F

## 2023-08-03 DIAGNOSIS — E78.5 HYPERLIPIDEMIA LDL GOAL <160: ICD-10-CM

## 2023-08-03 DIAGNOSIS — R10.2 SUPRAPUBIC PAIN: Primary | ICD-10-CM

## 2023-08-03 DIAGNOSIS — R10.2 SUPRAPUBIC PAIN: ICD-10-CM

## 2023-08-03 LAB
ALBUMIN UR-MCNC: NEGATIVE MG/DL
APPEARANCE UR: CLEAR
BASOPHILS # BLD AUTO: 0 10E3/UL (ref 0–0.2)
BASOPHILS NFR BLD AUTO: 0 %
BILIRUB UR QL STRIP: NEGATIVE
CHOLEST SERPL-MCNC: 203 MG/DL
COLOR UR AUTO: YELLOW
EOSINOPHIL # BLD AUTO: 0.2 10E3/UL (ref 0–0.7)
EOSINOPHIL NFR BLD AUTO: 3 %
ERYTHROCYTE [DISTWIDTH] IN BLOOD BY AUTOMATED COUNT: 12.3 % (ref 10–15)
GLUCOSE UR STRIP-MCNC: NEGATIVE MG/DL
HCT VFR BLD AUTO: 44.6 % (ref 40–53)
HDLC SERPL-MCNC: 35 MG/DL
HGB BLD-MCNC: 14.8 G/DL (ref 13.3–17.7)
HGB UR QL STRIP: NEGATIVE
IMM GRANULOCYTES # BLD: 0 10E3/UL
IMM GRANULOCYTES NFR BLD: 0 %
KETONES UR STRIP-MCNC: NEGATIVE MG/DL
LDLC SERPL CALC-MCNC: 124 MG/DL
LEUKOCYTE ESTERASE UR QL STRIP: NEGATIVE
LYMPHOCYTES # BLD AUTO: 1.7 10E3/UL (ref 0.8–5.3)
LYMPHOCYTES NFR BLD AUTO: 23 %
MCH RBC QN AUTO: 30.5 PG (ref 26.5–33)
MCHC RBC AUTO-ENTMCNC: 33.2 G/DL (ref 31.5–36.5)
MCV RBC AUTO: 92 FL (ref 78–100)
MONOCYTES # BLD AUTO: 0.8 10E3/UL (ref 0–1.3)
MONOCYTES NFR BLD AUTO: 11 %
NEUTROPHILS # BLD AUTO: 4.7 10E3/UL (ref 1.6–8.3)
NEUTROPHILS NFR BLD AUTO: 63 %
NITRATE UR QL: NEGATIVE
NONHDLC SERPL-MCNC: 168 MG/DL
PH UR STRIP: 5 [PH] (ref 5–7)
PLATELET # BLD AUTO: 263 10E3/UL (ref 150–450)
RBC # BLD AUTO: 4.86 10E6/UL (ref 4.4–5.9)
SP GR UR STRIP: 1.02 (ref 1–1.03)
TRIGL SERPL-MCNC: 220 MG/DL
UROBILINOGEN UR STRIP-ACNC: 0.2 E.U./DL
WBC # BLD AUTO: 7.4 10E3/UL (ref 4–11)

## 2023-08-03 PROCEDURE — 36415 COLL VENOUS BLD VENIPUNCTURE: CPT | Performed by: PHYSICIAN ASSISTANT

## 2023-08-03 PROCEDURE — 99214 OFFICE O/P EST MOD 30 MIN: CPT | Performed by: PHYSICIAN ASSISTANT

## 2023-08-03 PROCEDURE — 76705 ECHO EXAM OF ABDOMEN: CPT

## 2023-08-03 PROCEDURE — 85025 COMPLETE CBC W/AUTO DIFF WBC: CPT | Performed by: PHYSICIAN ASSISTANT

## 2023-08-03 PROCEDURE — 81003 URINALYSIS AUTO W/O SCOPE: CPT | Performed by: PHYSICIAN ASSISTANT

## 2023-08-03 PROCEDURE — 80061 LIPID PANEL: CPT | Performed by: PHYSICIAN ASSISTANT

## 2023-08-03 NOTE — PROGRESS NOTES
Assessment & Plan     Suprapubic pain    Will rule out infectious causes with labs today. Obtain US to evaluate for hernia. Follow-up as directed by results.     - CBC with platelets and differential; Future  - UA Macroscopic with reflex to Microscopic and Culture - Lab Collect; Future  - US Hernia Evaluation; Future  - CBC with platelets and differential  - UA Macroscopic with reflex to Microscopic and Culture - Lab Collect                     CODEY [facility]derrama [facility]    Subjective   KEREN is a 53 year old, presenting for the following health issues:  Abdominal Pain (Possible hernia)        8/3/2023     7:49 AM   Additional Questions   Roomed by Jo BELLAMY CMA       History of Present Illness       Reason for visit:  Possible hernia    He eats 2-3 servings of fruits and vegetables daily.He consumes 1 sweetened beverage(s) daily.He exercises with enough effort to increase his heart rate 30 to 60 minutes per day.  He exercises with enough effort to increase his heart rate 4 days per week.   He is taking medications regularly.     ABDOMINAL PAIN   Onset: 2-3 weeks  Description:   Character: Dull ache  Location: suprapubic region  Radiation: None  Intensity: mild  Progression of Symptoms:  same  Accompanying Signs & Symptoms:  Fever/Chills?: no   Gas/Bloating: no   Nausea: YES  Vomitting: no   Diarrhea?: no   Constipation:no   Dysuria or Hematuria: no  History:   Trauma: YES- heavy lifting at work  Previous similar pain: YES- hernia   Previous tests done: none  Precipitating factors:   Does the pain change with:     Food: no      BM: no     Urination: no   Alleviating factors:  none  Therapies Tried and outcome: none  LMP:  not applicable        Review of Systems   Constitutional, HEENT, cardiovascular, pulmonary, gi and gu systems are negative, except as otherwise noted.        Objective    /74 (BP Location: Right arm, Patient Position: Sitting, Cuff Size: Adult Regular)    "Pulse 58   Temp 97.7  F (36.5  C) (Oral)   Resp 16   Ht 1.651 m (5' 5\")   Wt 87.1 kg (192 lb)   SpO2 97%   BMI 31.95 kg/m    Body mass index is 31.95 kg/m .      Physical Exam   GENERAL: healthy, alert and no distress  EYES: Eyes grossly normal to inspection, PERRL and conjunctivae and sclerae normal  RESP: lungs clear to auscultation - no rales, rhonchi or wheezes  CV: regular rate and rhythm, normal S1 S2, no S3 or S4, no murmur, click or rub, no peripheral edema and peripheral pulses strong  ABDOMEN: Tender to palpation in suprapubic area. No bulges palpated. Otherwise soft, nontender, no hepatosplenomegaly, no masses and bowel sounds normal  MS: no gross musculoskeletal defects noted, no edema  SKIN: no suspicious lesions or rashes  NEURO: Normal strength and tone, mentation intact and speech normal  BACK: no CVA tenderness  PSYCH: mentation appears normal, affect normal/bright                      "

## 2024-01-04 ENCOUNTER — PATIENT OUTREACH (OUTPATIENT)
Dept: CARE COORDINATION | Facility: CLINIC | Age: 54
End: 2024-01-04
Payer: COMMERCIAL

## 2024-01-18 ENCOUNTER — PATIENT OUTREACH (OUTPATIENT)
Dept: CARE COORDINATION | Facility: CLINIC | Age: 54
End: 2024-01-18
Payer: COMMERCIAL

## 2024-02-09 DIAGNOSIS — E06.3 HYPOTHYROIDISM DUE TO HASHIMOTO'S THYROIDITIS: ICD-10-CM

## 2024-02-09 RX ORDER — LEVOTHYROXINE SODIUM 88 UG/1
88 TABLET ORAL DAILY
Qty: 90 TABLET | Refills: 0 | Status: SHIPPED | OUTPATIENT
Start: 2024-02-09 | End: 2024-04-02

## 2024-02-09 NOTE — TELEPHONE ENCOUNTER
Please call, pt needs to be seen.  Arline Miller MD  [facility]  [phone]

## 2024-02-27 ENCOUNTER — PATIENT OUTREACH (OUTPATIENT)
Dept: GASTROENTEROLOGY | Facility: CLINIC | Age: 54
End: 2024-02-27
Payer: COMMERCIAL

## 2024-03-24 ENCOUNTER — HEALTH MAINTENANCE LETTER (OUTPATIENT)
Age: 54
End: 2024-03-24

## 2024-04-02 ENCOUNTER — OFFICE VISIT (OUTPATIENT)
Dept: FAMILY MEDICINE | Facility: CLINIC | Age: 54
End: 2024-04-02
Payer: COMMERCIAL

## 2024-04-02 VITALS
SYSTOLIC BLOOD PRESSURE: 136 MMHG | RESPIRATION RATE: 12 BRPM | OXYGEN SATURATION: 96 % | HEIGHT: 65 IN | BODY MASS INDEX: 31.42 KG/M2 | DIASTOLIC BLOOD PRESSURE: 85 MMHG | WEIGHT: 188.6 LBS | HEART RATE: 77 BPM | TEMPERATURE: 98.4 F

## 2024-04-02 DIAGNOSIS — E06.3 HYPOTHYROIDISM DUE TO HASHIMOTO'S THYROIDITIS: ICD-10-CM

## 2024-04-02 DIAGNOSIS — Z00.00 ROUTINE GENERAL MEDICAL EXAMINATION AT A HEALTH CARE FACILITY: Primary | ICD-10-CM

## 2024-04-02 DIAGNOSIS — D12.6 TUBULAR ADENOMA OF COLON: ICD-10-CM

## 2024-04-02 LAB — TSH SERPL DL<=0.005 MIU/L-ACNC: 0.74 UIU/ML (ref 0.3–4.2)

## 2024-04-02 PROCEDURE — 36415 COLL VENOUS BLD VENIPUNCTURE: CPT | Performed by: FAMILY MEDICINE

## 2024-04-02 PROCEDURE — 99396 PREV VISIT EST AGE 40-64: CPT | Performed by: FAMILY MEDICINE

## 2024-04-02 PROCEDURE — 84443 ASSAY THYROID STIM HORMONE: CPT | Performed by: FAMILY MEDICINE

## 2024-04-02 PROCEDURE — 99213 OFFICE O/P EST LOW 20 MIN: CPT | Mod: 25 | Performed by: FAMILY MEDICINE

## 2024-04-02 RX ORDER — LEVOTHYROXINE SODIUM 88 UG/1
88 TABLET ORAL DAILY
Qty: 90 TABLET | Refills: 3 | Status: SHIPPED | OUTPATIENT
Start: 2024-04-02

## 2024-04-02 SDOH — HEALTH STABILITY: PHYSICAL HEALTH: ON AVERAGE, HOW MANY MINUTES DO YOU ENGAGE IN EXERCISE AT THIS LEVEL?: 30 MIN

## 2024-04-02 SDOH — HEALTH STABILITY: PHYSICAL HEALTH: ON AVERAGE, HOW MANY DAYS PER WEEK DO YOU ENGAGE IN MODERATE TO STRENUOUS EXERCISE (LIKE A BRISK WALK)?: 2 DAYS

## 2024-04-02 ASSESSMENT — SOCIAL DETERMINANTS OF HEALTH (SDOH): HOW OFTEN DO YOU GET TOGETHER WITH FRIENDS OR RELATIVES?: TWICE A WEEK

## 2024-04-02 NOTE — PATIENT INSTRUCTIONS
Preventive Care Advice   This is general advice given by our system to help you stay healthy. However, your care team may have specific advice just for you. Please talk to your care team about your preventive care needs.  Nutrition  Eat 5 or more servings of fruits and vegetables each day.  Try wheat bread, brown rice and whole grain pasta (instead of white bread, rice, and pasta).  Get enough calcium and vitamin D. Check the label on foods and aim for 100% of the RDA (recommended daily allowance).  Lifestyle  Exercise at least 150 minutes each week   (30 minutes a day, 5 days a week).  Do muscle strengthening activities 2 days a week. These help control your weight and prevent disease.  No smoking.  Wear sunscreen to prevent skin cancer.  Have a dental exam and cleaning every 6 months.  Yearly exams  See your health care team every year to talk about:  Any changes in your health.  Any medicines your care team has prescribed.  Preventive care, family planning, and ways to prevent chronic diseases.  Shots (vaccines)   HPV shots (up to age 26), if you've never had them before.  Hepatitis B shots (up to age 59), if you've never had them before.  COVID-19 shot: Get this shot when it's due.  Flu shot: Get a flu shot every year.  Tetanus shot: Get a tetanus shot every 10 years.  Pneumococcal, hepatitis A, and RSV shots: Ask your care team if you need these based on your risk.  Shingles shot (for age 50 and up).  General health tests  Diabetes screening:  Starting at age 35, Get screened for diabetes at least every 3 years.  If you are younger than age 35, ask your care team if you should be screened for diabetes.  Cholesterol test: At age 39, start having a cholesterol test every 5 years, or more often if advised.  Bone density scan (DEXA): At age 50, ask your care team if you should have this scan for osteoporosis (brittle bones).  Hepatitis C: Get tested at least once in your life.  STIs (sexually transmitted  infections)  Before age 24: Ask your care team if you should be screened for STIs.  After age 24: Get screened for STIs if you're at risk. You are at risk for STIs (including HIV) if:  You are sexually active with more than one person.  You don't use condoms every time.  You or a partner was diagnosed with a sexually transmitted infection.  If you are at risk for HIV, ask about PrEP medicine to prevent HIV.  Get tested for HIV at least once in your life, whether you are at risk for HIV or not.  Cancer screening tests  Cervical cancer screening: If you have a cervix, begin getting regular cervical cancer screening tests at age 21. Most people who have regular screenings with normal results can stop after age 65. Talk about this with your provider.  Breast cancer scan (mammogram): If you've ever had breasts, begin having regular mammograms starting at age 40. This is a scan to check for breast cancer.  Colon cancer screening: It is important to start screening for colon cancer at age 45.  Have a colonoscopy test every 10 years (or more often if you're at risk) Or, ask your provider about stool tests like a FIT test every year or Cologuard test every 3 years.  To learn more about your testing options, visit: https://www.[employer]+/944787.pdf.  For help making a decision, visit: https://bit.ly/kk81684.  Prostate cancer screening test: If you have a prostate and are age 55 to 69, ask your provider if you would benefit from a yearly prostate cancer screening test.  Lung cancer screening: If you are a current or former smoker age 50 to 80, ask your care team if ongoing lung cancer screenings are right for you.  For informational purposes only. Not to replace the advice of your health care provider. Copyright   2023 [employer]. All rights reserved. Clinically reviewed by the [facility] Transitions Program. [employer] 281029 - REV 01/24.

## 2024-04-02 NOTE — PROGRESS NOTES
"Preventive Care Visit  [facility]  Arline Miller MD, Family Medicine  Apr 2, 2024      Assessment & Plan     Hypothyroidism due to Hashimoto's thyroiditis  Controlled, check TSH and refill medicine accordingly.  - TSH WITH FREE T4 REFLEX; Future  - levothyroxine (SYNTHROID/LEVOTHROID) 88 MCG tablet; Take 1 tablet (88 mcg) by mouth daily  - TSH WITH FREE T4 REFLEX    Routine general medical examination at a health care facility  Today I counseled the patient about diet, regular exercise and weight loss planning.    Tubular adenoma of colon  Pt had last colonoscopy in 2019 that showed up to 6 polyps,repeat colnoscopy this year, pt to contact his previous GI specialist.              BMI  Estimated body mass index is 31.38 kg/m  as calculated from the following:    Height as of this encounter: 1.651 m (5' 5\").    Weight as of this encounter: 85.5 kg (188 lb 9.6 oz).   Weight management plan: Discussed healthy diet and exercise guidelines    Counseling  Appropriate preventive services were discussed with this patient, including applicable screening as appropriate for fall prevention, nutrition, physical activity, Tobacco-use cessation, weight loss and cognition.  Checklist reviewing preventive services available has been given to the patient.  Reviewed patient's diet, addressing concerns and/or questions.   He is at risk for lack of exercise and has been provided with information to increase physical activity for the benefit of his well-being.           John VELIZ is a 53 year old, presenting for the following:  Physical, Refill Request, and Thyroid Problem        4/2/2024    10:10 AM   Additional Questions   Roomed by brian amaya        Health Care Directive  Patient does not have a Health Care Directive or Living Will: Discussed advance care planning with patient; information given to patient to review.    HPI      Hypothyroidism Follow-up    Since last visit, patient describes " the following symptoms: Weight stable, no hair loss, no skin changes, no constipation, no loose stools        2024   General Health   How would you rate your overall physical health? Good   Feel stress (tense, anxious, or unable to sleep) Not at all         2024   Nutrition   Three or more servings of calcium each day? Yes   Diet: Regular (no restrictions)   How many servings of fruit and vegetables per day? (!) 2-3   How many sweetened beverages each day? 0-1         2024   Exercise   Days per week of moderate/strenous exercise 2 days   Average minutes spent exercising at this level 30 min   (!) EXERCISE CONCERN      2024   Social Factors   Frequency of gathering with friends or relatives Twice a week   Worry food won't last until get money to buy more No    No   Food not last or not have enough money for food? No    No   Do you have housing?  Yes    Yes   Are you worried about losing your housing? No    No   Lack of transportation? No    No   Unable to get utilities (heat,electricity)? No    No          No data to display                   2024   Dental   Dentist two times every year? Yes         2024   TB Screening   Were you born outside of the [place]? No         Today's PHQ-2 Score:       2024    10:01 AM   PHQ-2 (  [employer])   Q1: Little interest or pleasure in doing things 0   Q2: Feeling down, depressed or hopeless 0   PHQ-2 Score 0   Q1: Little interest or pleasure in doing things Not at all   Q2: Feeling down, depressed or hopeless Not at all   PHQ-2 Score 0           2024   Substance Use   Alcohol more than 3/day or more than 7/wk No   Do you use any other substances recreationally? No     Social History     Tobacco Use    Smoking status: Former     Types: Cigars     Quit date:      Years since quittin.2    Smokeless tobacco: Never    Tobacco comments:     a cigar every 4 months   Vaping Use    Vaping Use: Never used   Substance Use Topics    Alcohol use: Yes      "Alcohol/week: 0.0 standard drinks of alcohol     Comment: rarely    Drug use: No           4/2/2024   STI Screening   New sexual partner(s) since last STI/HIV test? No   ASCVD Risk   The 10-year ASCVD risk score (Lorena DELEON, et al., 2019) is: 8%    Values used to calculate the score:      Age: 53 years      Sex: Male      Is Non- : No      Diabetic: No      Tobacco smoker: No      Systolic Blood Pressure: 136 mmHg      Is BP treated: No      HDL Cholesterol: 35 mg/dL      Total Cholesterol: 203 mg/dL           Reviewed and updated as needed this visit by Provider                    Past Medical History:   Diagnosis Date    Bilateral inguinal hernia 6/13/2018    S/p repair.    Hypothyroidism 7/14/2010    Hypothyroidism due to Hashimoto's thyroiditis 7/10/2017    Intrinsic eczema 7/10/2017    Mixed hyperlipidemia 10/19/2006    Psoriasis 7/14/2010     Past Surgical History:   Procedure Laterality Date    LAPAROSCOPIC HERNIORRHAPHY INGUINAL BILATERAL Bilateral 11/22/2017    Procedure: LAPAROSCOPIC HERNIORRHAPHY INGUINAL BILATERAL;  LAPAROSCOPIC BILATERAL INGUINAL HERNIA REPAIR WITH MESH;  Surgeon: Kae Newberry MD;  Location: [facility]         Review of Systems  Constitutional, HEENT, cardiovascular, pulmonary, GI, , musculoskeletal, neuro, skin, endocrine and psych systems are negative, except as otherwise noted.     Objective    Exam  /85 (BP Location: Left arm, Patient Position: Sitting, Cuff Size: Adult Regular)   Pulse 77   Temp 98.4  F (36.9  C) (Oral)   Resp 12   Ht 1.651 m (5' 5\")   Wt 85.5 kg (188 lb 9.6 oz)   SpO2 96%   BMI 31.38 kg/m     Estimated body mass index is 31.38 kg/m  as calculated from the following:    Height as of this encounter: 1.651 m (5' 5\").    Weight as of this encounter: 85.5 kg (188 lb 9.6 oz).    Physical Exam  GENERAL: alert and no distress  EYES: Eyes grossly normal to inspection, PERRL and conjunctivae and sclerae normal  HENT: ear canals " and TM's normal, nose and mouth without ulcers or lesions  NECK: no adenopathy, no asymmetry, masses, or scars  RESP: lungs clear to auscultation - no rales, rhonchi or wheezes  CV: regular rate and rhythm, normal S1 S2, no S3 or S4, no murmur, click or rub, no peripheral edema  ABDOMEN: soft, nontender, no hepatosplenomegaly, no masses and bowel sounds normal  MS: no gross musculoskeletal defects noted, no edema  SKIN: no suspicious lesions or rashes  NEURO: Normal strength and tone, mentation intact and speech normal  PSYCH: mentation appears normal, affect normal/bright        Signed Electronically by: Arline Miller MD

## 2024-05-15 ENCOUNTER — PATIENT OUTREACH (OUTPATIENT)
Dept: GASTROENTEROLOGY | Facility: CLINIC | Age: 54
End: 2024-05-15
Payer: COMMERCIAL

## 2024-08-02 ENCOUNTER — TRANSFERRED RECORDS (OUTPATIENT)
Dept: MULTI SPECIALTY CLINIC | Facility: CLINIC | Age: 54
End: 2024-08-02

## 2025-03-03 ENCOUNTER — PATIENT OUTREACH (OUTPATIENT)
Dept: CARE COORDINATION | Facility: CLINIC | Age: 55
End: 2025-03-03
Payer: COMMERCIAL

## 2025-03-17 ENCOUNTER — PATIENT OUTREACH (OUTPATIENT)
Dept: CARE COORDINATION | Facility: CLINIC | Age: 55
End: 2025-03-17
Payer: COMMERCIAL

## 2025-05-04 ENCOUNTER — HEALTH MAINTENANCE LETTER (OUTPATIENT)
Age: 55
End: 2025-05-04

## 2025-05-11 DIAGNOSIS — E06.3 HYPOTHYROIDISM DUE TO HASHIMOTO'S THYROIDITIS: ICD-10-CM

## 2025-05-12 RX ORDER — LEVOTHYROXINE SODIUM 88 UG/1
88 TABLET ORAL DAILY
Qty: 60 TABLET | Refills: 0 | Status: SHIPPED | OUTPATIENT
Start: 2025-05-12

## 2025-05-12 NOTE — TELEPHONE ENCOUNTER
Please reach out to patient to assist with appt per pcp message below     Thank you   Elizabeth Malone, Registered Nurse  [facility]

## 2025-05-14 NOTE — TELEPHONE ENCOUNTER
Called pt-informed appointment needed-declined to schedule at this time-stated he will call back.    Eva Rodas/GAY

## 2025-06-19 ENCOUNTER — OFFICE VISIT (OUTPATIENT)
Dept: FAMILY MEDICINE | Facility: CLINIC | Age: 55
End: 2025-06-19
Payer: COMMERCIAL

## 2025-06-19 VITALS
HEART RATE: 53 BPM | OXYGEN SATURATION: 96 % | TEMPERATURE: 97.7 F | HEIGHT: 65 IN | WEIGHT: 179 LBS | BODY MASS INDEX: 29.82 KG/M2 | SYSTOLIC BLOOD PRESSURE: 113 MMHG | DIASTOLIC BLOOD PRESSURE: 71 MMHG | RESPIRATION RATE: 20 BRPM

## 2025-06-19 DIAGNOSIS — E06.3 HYPOTHYROIDISM DUE TO HASHIMOTO'S THYROIDITIS: ICD-10-CM

## 2025-06-19 DIAGNOSIS — E78.5 HYPERLIPIDEMIA LDL GOAL <160: ICD-10-CM

## 2025-06-19 DIAGNOSIS — Z00.00 ROUTINE GENERAL MEDICAL EXAMINATION AT A HEALTH CARE FACILITY: Primary | ICD-10-CM

## 2025-06-19 LAB
CHOLEST SERPL-MCNC: 240 MG/DL
FASTING STATUS PATIENT QL REPORTED: ABNORMAL
FASTING STATUS PATIENT QL REPORTED: ABNORMAL
GLUCOSE SERPL-MCNC: 106 MG/DL (ref 70–99)
HDLC SERPL-MCNC: 40 MG/DL
LDLC SERPL CALC-MCNC: 159 MG/DL
NONHDLC SERPL-MCNC: 200 MG/DL
PSA SERPL DL<=0.01 NG/ML-MCNC: 0.26 NG/ML (ref 0–3.5)
TRIGL SERPL-MCNC: 204 MG/DL
TSH SERPL DL<=0.005 MIU/L-ACNC: 0.75 UIU/ML (ref 0.3–4.2)

## 2025-06-19 RX ORDER — LEVOTHYROXINE SODIUM 88 UG/1
88 TABLET ORAL DAILY
Qty: 90 TABLET | Refills: 3 | Status: SHIPPED | OUTPATIENT
Start: 2025-06-19

## 2025-06-19 SDOH — HEALTH STABILITY: PHYSICAL HEALTH: ON AVERAGE, HOW MANY MINUTES DO YOU ENGAGE IN EXERCISE AT THIS LEVEL?: 30 MIN

## 2025-06-19 SDOH — HEALTH STABILITY: PHYSICAL HEALTH: ON AVERAGE, HOW MANY DAYS PER WEEK DO YOU ENGAGE IN MODERATE TO STRENUOUS EXERCISE (LIKE A BRISK WALK)?: 1 DAY

## 2025-06-19 ASSESSMENT — SOCIAL DETERMINANTS OF HEALTH (SDOH): HOW OFTEN DO YOU GET TOGETHER WITH FRIENDS OR RELATIVES?: ONCE A WEEK

## 2025-06-19 ASSESSMENT — PAIN SCALES - GENERAL: PAINLEVEL_OUTOF10: NO PAIN (0)

## 2025-06-19 NOTE — PROGRESS NOTES
"Preventive Care Visit  [facility]  Arline Miller MD, Family Medicine  Jun 19, 2025      Assessment & Plan     Routine general medical examination:  - Routine check-up with no specific issues raised.  - Administer Prevnar pneumonia vaccine and Shingrix shingles vaccine. Conduct lab tests for thyroid function, cholesterol, blood sugar, and prostate cancer screening.  - Prescriptions: Prevnar pneumonia vaccine, Shingrix shingles vaccine.  - Risks and side effects: Shingrix vaccine may cause fatigue and achiness the next day.    Hyperlipidemia LDL goal <160:  - Hyperlipidemia management.  - Check cholesterol levels as part of lab tests.  - Prescriptions: None specified.    Hypothyroidism due to Hashimoto's thyroiditis:  - Ongoing management of hypothyroidism.  - Check thyroid function as part of lab tests.  - Prescriptions: Continue current thyroid medication. Levothyroxine at 88 mcg daily.    Consent was obtained from the patient to use an AI documentation tool in the creation of this note.            BMI  Estimated body mass index is 29.79 kg/m  as calculated from the following:    Height as of this encounter: 1.651 m (5' 5\").    Weight as of this encounter: 81.2 kg (179 lb).   Weight management plan: Discussed healthy diet and exercise guidelines  Reviewed preventive health counseling, as reflected in patient instructions       Regular exercise       Healthy diet/nutrition  Counseling  Appropriate preventive services were addressed with this patient via screening, questionnaire, or discussion as appropriate for fall prevention, nutrition, physical activity, Tobacco-use cessation, social engagement, weight loss and cognition.  Checklist reviewing preventive services available has been given to the patient.  Reviewed patient's diet, addressing concerns and/or questions.   He is at risk for lack of exercise and has been provided with information to increase physical activity for the benefit of " his well-being.         Follow-up    Follow-up Visit   Expected date:  Jun 19, 2026 (Approximate)      Follow Up Appointment Details:     Follow-up with whom?: PCP    Follow-Up for what?: Adult Preventive    How?: In Person                 Subjective   KEREN is a 55 year old, presenting for the following:  Physical        6/19/2025     6:51 AM   Additional Questions   Roomed by jim hsu   Accompanied by self          HPI  History of Present Illness-  - Patient's name is Keren Worrell, age 55.  - Reports not getting enough exercise and being overweight. He plans to start exercising more after snf, which he anticipates in the next year or two.  - Experiences stress and anxiety, particularly during staff presentations, leading to elevated blood pressure. 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  - Has been taking thyroid medication once a day for hypothyroidism due to Hashimoto's thyroiditis.  - Underwent two colonoscopies, with no specific issues mentioned. There is a family history of polyps on the mother's side, which may necessitate more frequent colonoscopies.  - Has not received a pneumonia vaccine before and is due for one after age 50.  - Has not received a shingles vaccine before and is due for one after age 49. He is aware that the shingles vaccine may cause fatigue and achiness the next day.  - Experienced feeling sick after receiving a flu shot in the past, leading to avoidance of flu vaccinations.         Advance Care Planning    Discussed advance care planning with patient; informed AVS has link to [employer].        6/19/2025   General Health   How would you rate your overall physical health? Good   Feel stress (tense, anxious, or unable to sleep) Only a little   (!) STRESS CONCERN      6/19/2025   Nutrition   Three or more servings of calcium each day? Yes    Diet: Regular (no restrictions)   How many servings of fruit and vegetables per day? (!) 2-3   How many sweetened beverages each day? 0-1         2025   Exercise   Days per week of moderate/strenous exercise 1 day   Average minutes spent exercising at this level 30 min   (!) EXERCISE CONCERN      2025   Social Factors   Frequency of gathering with friends or relatives Once a week   Worry food won't last until get money to buy more No   Food not last or not have enough money for food? No   Do you have housing? (Housing is defined as stable permanent housing and does not include staying outside in a car, in a tent, in an abandoned building, in an overnight shelter, or couch-surfing.) Yes   Are you worried about losing your housing? No   Lack of transportation? No   Unable to get utilities (heat,electricity)? No         2025   Fall Risk   Fallen 2 or more times in the past year? No   Trouble with walking or balance? No          2025   Dental   Dentist two times every year? Yes         Today's PHQ-2 Score:       2025     6:50 AM   PHQ-2 (  [employer])   Q1: Little interest or pleasure in doing things 0   Q2: Feeling down, depressed or hopeless 0   PHQ-2 Score 0    Q1: Little interest or pleasure in doing things Not at all   Q2: Feeling down, depressed or hopeless Not at all   PHQ-2 Score 0       Patient-reported           2025   Substance Use   Alcohol more than 3/day or more than 7/wk No   Do you use any other substances recreationally? No     Social History     Tobacco Use    Smoking status: Former     Types: Cigars     Quit date:      Years since quittin.4    Smokeless tobacco: Never    Tobacco comments:     a cigar every 4 months   Vaping Use    Vaping status: Never Used   Substance Use Topics    Alcohol use: Yes     Alcohol/week: 0.0 standard drinks of alcohol     Comment: rarely    Drug use: No           2025   STI Screening   New sexual partner(s) since last STI/HIV  "test? No   Last PSA: No results found for: \"PSA\"  ASCVD Risk   The 10-year ASCVD risk score (Lorena DELEON, et al., 2019) is: 6.8%    Values used to calculate the score:      Age: 55 years      Sex: Male      Is Non- : No      Diabetic: No      Tobacco smoker: No      Systolic Blood Pressure: 113 mmHg      Is BP treated: No      HDL Cholesterol: 35 mg/dL      Total Cholesterol: 203 mg/dL           Reviewed and updated as needed this visit by Provider                    Past Medical History:   Diagnosis Date    Bilateral inguinal hernia 6/13/2018    S/p repair.    Hypothyroidism 7/14/2010    Hypothyroidism due to Hashimoto's thyroiditis 7/10/2017    Intrinsic eczema 7/10/2017    Mixed hyperlipidemia 10/19/2006    Psoriasis 7/14/2010     Past Surgical History:   Procedure Laterality Date    LAPAROSCOPIC HERNIORRHAPHY INGUINAL BILATERAL Bilateral 11/22/2017    Procedure: LAPAROSCOPIC HERNIORRHAPHY INGUINAL BILATERAL;  LAPAROSCOPIC BILATERAL INGUINAL HERNIA REPAIR WITH MESH;  Surgeon: Kae Newberry MD;  Location: [facility]         Review of Systems  Constitutional, HEENT, cardiovascular, pulmonary, GI, , musculoskeletal, neuro, skin, endocrine and psych systems are negative, except as otherwise noted.     Objective    Exam  /71 (BP Location: Left arm, Patient Position: Chair, Cuff Size: Adult Large)   Pulse 53   Temp 97.7  F (36.5  C) (Oral)   Resp 20   Ht 1.651 m (5' 5\")   Wt 81.2 kg (179 lb)   SpO2 96%   BMI 29.79 kg/m     Estimated body mass index is 29.79 kg/m  as calculated from the following:    Height as of this encounter: 1.651 m (5' 5\").    Weight as of this encounter: 81.2 kg (179 lb).    Physical Exam  GENERAL: alert and no distress  EYES: Eyes grossly normal to inspection, PERRL and conjunctivae and sclerae normal  HENT: ear canals and TM's normal, nose and mouth without ulcers or lesions  NECK: no adenopathy, no asymmetry, masses, or scars  RESP: lungs clear to " auscultation - no rales, rhonchi or wheezes  CV: regular rate and rhythm, normal S1 S2, no S3 or S4, no murmur, click or rub, no peripheral edema  ABDOMEN: soft, nontender, no hepatosplenomegaly, no masses and bowel sounds normal  MS: no gross musculoskeletal defects noted, no edema  SKIN: no suspicious lesions or rashes  NEURO: Normal strength and tone, mentation intact and speech normal  PSYCH: mentation appears normal, affect normal/bright        Signed Electronically by: Arline Miller MD

## 2025-06-20 ENCOUNTER — RESULTS FOLLOW-UP (OUTPATIENT)
Dept: FAMILY MEDICINE | Facility: CLINIC | Age: 55
End: 2025-06-20

## (undated) DEVICE — ESU CORD MONOPOLAR 10'  E0510

## (undated) DEVICE — BLADE CLIPPER 4406

## (undated) DEVICE — SU VICRYL 0 UR-6 27" J603H

## (undated) DEVICE — ENDO TROCAR BALLOON TIP 10MM  OMS-T10SB

## (undated) DEVICE — ESU PENCIL W/HOLSTER E2350H

## (undated) DEVICE — GLOVE PROTEXIS BLUE W/NEU-THERA 6.5  2D73EB65

## (undated) DEVICE — PREP CHLORAPREP 26ML TINTED ORANGE  260815

## (undated) DEVICE — ENDO TROCAR DISSECTING BALLOON OMS-PDBS2

## (undated) DEVICE — ESU HOLDER LAP INST DISP PURPLE LONG 330MM H-PRO-330

## (undated) DEVICE — GLOVE PROTEXIS MICRO 6.0  2D73PM60

## (undated) DEVICE — SOL NACL 0.9% IRRIG 1000ML BOTTLE 07138-09

## (undated) DEVICE — ENDO TROCAR 05.5MM PEDI 24055

## (undated) DEVICE — PACK LAP CHOLE SLC15LCFSD

## (undated) DEVICE — LINEN TOWEL PACK X5 5464

## (undated) DEVICE — CATH TRAY FOLEY COUDE 16FR SILVER W/URINE METER 350ML 304716

## (undated) DEVICE — SU VICRYL 4-0 PS-2 18" UND J496H

## (undated) RX ORDER — LIDOCAINE HYDROCHLORIDE 20 MG/ML
INJECTION, SOLUTION EPIDURAL; INFILTRATION; INTRACAUDAL; PERINEURAL
Status: DISPENSED
Start: 2017-11-22

## (undated) RX ORDER — KETOROLAC TROMETHAMINE 30 MG/ML
INJECTION, SOLUTION INTRAMUSCULAR; INTRAVENOUS
Status: DISPENSED
Start: 2017-11-22

## (undated) RX ORDER — DEXAMETHASONE SODIUM PHOSPHATE 4 MG/ML
INJECTION, SOLUTION INTRA-ARTICULAR; INTRALESIONAL; INTRAMUSCULAR; INTRAVENOUS; SOFT TISSUE
Status: DISPENSED
Start: 2017-11-22

## (undated) RX ORDER — ONDANSETRON 2 MG/ML
INJECTION INTRAMUSCULAR; INTRAVENOUS
Status: DISPENSED
Start: 2017-11-22

## (undated) RX ORDER — FENTANYL CITRATE 50 UG/ML
INJECTION, SOLUTION INTRAMUSCULAR; INTRAVENOUS
Status: DISPENSED
Start: 2017-11-22

## (undated) RX ORDER — PROPOFOL 10 MG/ML
INJECTION, EMULSION INTRAVENOUS
Status: DISPENSED
Start: 2017-11-22

## (undated) RX ORDER — OXYCODONE AND ACETAMINOPHEN 5; 325 MG/1; MG/1
TABLET ORAL
Status: DISPENSED
Start: 2017-11-22

## (undated) RX ORDER — CLINDAMYCIN PHOSPHATE 900 MG/50ML
INJECTION, SOLUTION INTRAVENOUS
Status: DISPENSED
Start: 2017-11-22